# Patient Record
Sex: MALE | Race: WHITE | Employment: FULL TIME | URBAN - NONMETROPOLITAN AREA
[De-identification: names, ages, dates, MRNs, and addresses within clinical notes are randomized per-mention and may not be internally consistent; named-entity substitution may affect disease eponyms.]

---

## 2017-07-17 ENCOUNTER — APPOINTMENT (OUTPATIENT)
Dept: PHYSICAL THERAPY | Age: 72
End: 2017-07-17
Payer: MEDICARE

## 2017-07-17 ENCOUNTER — HOSPITAL ENCOUNTER (OUTPATIENT)
Dept: PHYSICAL THERAPY | Age: 72
Setting detail: THERAPIES SERIES
Discharge: HOME | End: 2017-07-17
Payer: MEDICARE

## 2017-07-17 PROCEDURE — 97110 THERAPEUTIC EXERCISES: CPT

## 2017-07-17 ASSESSMENT — PAIN SCALES - GENERAL: PAINLEVEL_OUTOF10: 3

## 2017-07-17 ASSESSMENT — PAIN DESCRIPTION - ORIENTATION: ORIENTATION: LEFT

## 2017-07-17 ASSESSMENT — PAIN DESCRIPTION - LOCATION: LOCATION: KNEE

## 2017-07-18 ENCOUNTER — APPOINTMENT (OUTPATIENT)
Dept: PHYSICAL THERAPY | Age: 72
End: 2017-07-18
Payer: MEDICARE

## 2017-07-19 ENCOUNTER — HOSPITAL ENCOUNTER (OUTPATIENT)
Dept: PHYSICAL THERAPY | Age: 72
Setting detail: THERAPIES SERIES
Discharge: HOME | End: 2017-07-19
Payer: MEDICARE

## 2017-07-19 PROCEDURE — 97110 THERAPEUTIC EXERCISES: CPT

## 2017-07-19 ASSESSMENT — PAIN DESCRIPTION - PAIN TYPE: TYPE: SURGICAL PAIN

## 2017-07-19 ASSESSMENT — PAIN DESCRIPTION - LOCATION: LOCATION: KNEE

## 2017-07-19 ASSESSMENT — PAIN DESCRIPTION - ORIENTATION: ORIENTATION: LEFT

## 2017-07-19 ASSESSMENT — PAIN SCALES - GENERAL: PAINLEVEL_OUTOF10: 3

## 2017-07-21 ENCOUNTER — HOSPITAL ENCOUNTER (OUTPATIENT)
Dept: PHYSICAL THERAPY | Age: 72
Setting detail: THERAPIES SERIES
Discharge: HOME | End: 2017-07-21
Payer: MEDICARE

## 2017-07-21 PROCEDURE — 97110 THERAPEUTIC EXERCISES: CPT

## 2017-07-25 ENCOUNTER — HOSPITAL ENCOUNTER (OUTPATIENT)
Dept: PHYSICAL THERAPY | Age: 72
Setting detail: THERAPIES SERIES
Discharge: HOME | End: 2017-07-25
Payer: MEDICARE

## 2017-07-25 PROCEDURE — 97110 THERAPEUTIC EXERCISES: CPT

## 2017-07-25 ASSESSMENT — PAIN DESCRIPTION - ORIENTATION: ORIENTATION: LEFT

## 2017-07-25 ASSESSMENT — PAIN DESCRIPTION - LOCATION: LOCATION: KNEE

## 2017-07-25 ASSESSMENT — PAIN SCALES - GENERAL: PAINLEVEL_OUTOF10: 3

## 2017-07-25 ASSESSMENT — PAIN DESCRIPTION - PAIN TYPE: TYPE: SURGICAL PAIN

## 2017-07-26 ENCOUNTER — HOSPITAL ENCOUNTER (OUTPATIENT)
Dept: PHYSICAL THERAPY | Age: 72
Setting detail: THERAPIES SERIES
Discharge: HOME | End: 2017-07-26
Payer: MEDICARE

## 2017-07-26 PROCEDURE — 97110 THERAPEUTIC EXERCISES: CPT

## 2017-07-26 ASSESSMENT — PAIN DESCRIPTION - ORIENTATION: ORIENTATION: LEFT

## 2017-07-26 ASSESSMENT — PAIN DESCRIPTION - LOCATION: LOCATION: KNEE

## 2017-07-26 ASSESSMENT — PAIN DESCRIPTION - PAIN TYPE: TYPE: SURGICAL PAIN

## 2017-07-26 ASSESSMENT — PAIN SCALES - GENERAL: PAINLEVEL_OUTOF10: 3

## 2017-08-01 ENCOUNTER — HOSPITAL ENCOUNTER (OUTPATIENT)
Dept: PHYSICAL THERAPY | Age: 72
Setting detail: THERAPIES SERIES
Discharge: HOME | End: 2017-08-01
Payer: MEDICARE

## 2017-08-01 PROCEDURE — 97110 THERAPEUTIC EXERCISES: CPT

## 2017-08-01 PROCEDURE — G8979 MOBILITY GOAL STATUS: HCPCS

## 2017-08-01 PROCEDURE — G8978 MOBILITY CURRENT STATUS: HCPCS

## 2017-08-03 ENCOUNTER — HOSPITAL ENCOUNTER (OUTPATIENT)
Dept: PHYSICAL THERAPY | Age: 72
Setting detail: THERAPIES SERIES
Discharge: HOME | End: 2017-08-03
Payer: MEDICARE

## 2017-08-03 PROCEDURE — 97110 THERAPEUTIC EXERCISES: CPT

## 2017-08-03 ASSESSMENT — PAIN SCALES - GENERAL: PAINLEVEL_OUTOF10: 3

## 2017-08-04 ENCOUNTER — HOSPITAL ENCOUNTER (OUTPATIENT)
Dept: PHYSICAL THERAPY | Age: 72
Setting detail: THERAPIES SERIES
Discharge: HOME | End: 2017-08-04
Payer: MEDICARE

## 2017-08-04 PROCEDURE — 97110 THERAPEUTIC EXERCISES: CPT

## 2017-08-07 ENCOUNTER — HOSPITAL ENCOUNTER (OUTPATIENT)
Dept: PHYSICAL THERAPY | Age: 72
Setting detail: THERAPIES SERIES
Discharge: HOME | End: 2017-08-07
Payer: MEDICARE

## 2017-08-07 PROCEDURE — 97110 THERAPEUTIC EXERCISES: CPT

## 2017-08-09 ENCOUNTER — HOSPITAL ENCOUNTER (OUTPATIENT)
Dept: PHYSICAL THERAPY | Age: 72
Setting detail: THERAPIES SERIES
Discharge: HOME | End: 2017-08-09
Payer: MEDICARE

## 2017-08-09 PROCEDURE — 97110 THERAPEUTIC EXERCISES: CPT

## 2017-08-14 ENCOUNTER — HOSPITAL ENCOUNTER (OUTPATIENT)
Dept: PHYSICAL THERAPY | Age: 72
Setting detail: THERAPIES SERIES
Discharge: HOME | End: 2017-08-14
Payer: MEDICARE

## 2017-08-14 PROCEDURE — 97110 THERAPEUTIC EXERCISES: CPT

## 2017-08-16 ENCOUNTER — HOSPITAL ENCOUNTER (OUTPATIENT)
Dept: PHYSICAL THERAPY | Age: 72
Setting detail: THERAPIES SERIES
Discharge: HOME | End: 2017-08-16
Payer: MEDICARE

## 2017-08-16 PROCEDURE — 97110 THERAPEUTIC EXERCISES: CPT

## 2017-08-18 ENCOUNTER — HOSPITAL ENCOUNTER (OUTPATIENT)
Dept: PHYSICAL THERAPY | Age: 72
Setting detail: THERAPIES SERIES
Discharge: HOME | End: 2017-08-18
Payer: MEDICARE

## 2017-08-18 PROCEDURE — 97110 THERAPEUTIC EXERCISES: CPT

## 2017-08-18 ASSESSMENT — PAIN DESCRIPTION - LOCATION: LOCATION: KNEE

## 2017-08-18 ASSESSMENT — PAIN DESCRIPTION - ORIENTATION: ORIENTATION: LEFT

## 2017-08-18 ASSESSMENT — PAIN SCALES - GENERAL: PAINLEVEL_OUTOF10: 2

## 2017-08-21 ENCOUNTER — HOSPITAL ENCOUNTER (OUTPATIENT)
Dept: PHYSICAL THERAPY | Age: 72
Setting detail: THERAPIES SERIES
Discharge: HOME | End: 2017-08-21
Payer: MEDICARE

## 2017-08-21 PROCEDURE — 97110 THERAPEUTIC EXERCISES: CPT

## 2017-08-21 ASSESSMENT — PAIN SCALES - GENERAL: PAINLEVEL_OUTOF10: 2

## 2017-08-23 ENCOUNTER — HOSPITAL ENCOUNTER (OUTPATIENT)
Dept: PHYSICAL THERAPY | Age: 72
Setting detail: THERAPIES SERIES
Discharge: HOME | End: 2017-08-23
Payer: MEDICARE

## 2017-08-23 PROCEDURE — 97110 THERAPEUTIC EXERCISES: CPT

## 2017-08-23 ASSESSMENT — PAIN SCALES - GENERAL: PAINLEVEL_OUTOF10: 1

## 2017-08-23 ASSESSMENT — PAIN DESCRIPTION - PAIN TYPE: TYPE: SURGICAL PAIN

## 2017-08-23 ASSESSMENT — PAIN DESCRIPTION - LOCATION: LOCATION: KNEE

## 2017-08-23 ASSESSMENT — PAIN DESCRIPTION - ORIENTATION: ORIENTATION: LEFT

## 2017-08-25 ENCOUNTER — HOSPITAL ENCOUNTER (OUTPATIENT)
Dept: PHYSICAL THERAPY | Age: 72
Setting detail: THERAPIES SERIES
Discharge: HOME | End: 2017-08-25
Payer: MEDICARE

## 2017-08-25 PROCEDURE — 97110 THERAPEUTIC EXERCISES: CPT

## 2017-08-25 PROCEDURE — G8979 MOBILITY GOAL STATUS: HCPCS

## 2017-08-25 PROCEDURE — G8980 MOBILITY D/C STATUS: HCPCS

## 2022-08-02 ENCOUNTER — APPOINTMENT (OUTPATIENT)
Dept: GENERAL RADIOLOGY | Age: 77
DRG: 287 | End: 2022-08-02
Payer: MEDICARE

## 2022-08-02 ENCOUNTER — HOSPITAL ENCOUNTER (INPATIENT)
Age: 77
LOS: 1 days | Discharge: HOME | DRG: 287 | End: 2022-08-05
Attending: HOSPITALIST | Admitting: STUDENT IN AN ORGANIZED HEALTH CARE EDUCATION/TRAINING PROGRAM
Payer: MEDICARE

## 2022-08-02 DIAGNOSIS — R07.9 CHEST PAIN, UNSPECIFIED TYPE: Primary | ICD-10-CM

## 2022-08-02 LAB
ALBUMIN SERPL-MCNC: 4.2 G/DL (ref 3.5–5.1)
ALP BLD-CCNC: 72 U/L (ref 38–126)
ALT SERPL-CCNC: 13 U/L (ref 11–66)
ANION GAP SERPL CALCULATED.3IONS-SCNC: 9 MEQ/L (ref 8–16)
AST SERPL-CCNC: 19 U/L (ref 5–40)
BASOPHILS # BLD: 1 %
BASOPHILS ABSOLUTE: 0.1 THOU/MM3 (ref 0–0.1)
BILIRUB SERPL-MCNC: 0.4 MG/DL (ref 0.3–1.2)
BILIRUBIN DIRECT: < 0.2 MG/DL (ref 0–0.3)
BUN BLDV-MCNC: 20 MG/DL (ref 7–22)
CALCIUM SERPL-MCNC: 9.7 MG/DL (ref 8.5–10.5)
CHLORIDE BLD-SCNC: 106 MEQ/L (ref 98–111)
CHOLESTEROL, TOTAL: 136 MG/DL (ref 100–199)
CO2: 28 MEQ/L (ref 23–33)
CREAT SERPL-MCNC: 1.2 MG/DL (ref 0.4–1.2)
EKG ATRIAL RATE: 58 BPM
EKG P AXIS: 14 DEGREES
EKG P-R INTERVAL: 220 MS
EKG Q-T INTERVAL: 444 MS
EKG QRS DURATION: 164 MS
EKG QTC CALCULATION (BAZETT): 435 MS
EKG R AXIS: -13 DEGREES
EKG T AXIS: 34 DEGREES
EKG VENTRICULAR RATE: 58 BPM
EOSINOPHIL # BLD: 4.9 %
EOSINOPHILS ABSOLUTE: 0.4 THOU/MM3 (ref 0–0.4)
ERYTHROCYTE [DISTWIDTH] IN BLOOD BY AUTOMATED COUNT: 12.9 % (ref 11.5–14.5)
ERYTHROCYTE [DISTWIDTH] IN BLOOD BY AUTOMATED COUNT: 46.5 FL (ref 35–45)
GFR SERPL CREATININE-BSD FRML MDRD: 59 ML/MIN/1.73M2
GLUCOSE BLD-MCNC: 98 MG/DL (ref 70–108)
HCT VFR BLD CALC: 44.5 % (ref 42–52)
HDLC SERPL-MCNC: 58 MG/DL
HEMOGLOBIN: 14.4 GM/DL (ref 14–18)
IMMATURE GRANS (ABS): 0.01 THOU/MM3 (ref 0–0.07)
IMMATURE GRANULOCYTES: 0.1 %
LDL CHOLESTEROL CALCULATED: 65 MG/DL
LIPASE: 29.2 U/L (ref 5.6–51.3)
LV EF: 53 %
LVEF MODALITY: NORMAL
LYMPHOCYTES # BLD: 28.9 %
LYMPHOCYTES ABSOLUTE: 2.3 THOU/MM3 (ref 1–4.8)
MCH RBC QN AUTO: 31.5 PG (ref 26–33)
MCHC RBC AUTO-ENTMCNC: 32.4 GM/DL (ref 32.2–35.5)
MCV RBC AUTO: 97.4 FL (ref 80–94)
MONOCYTES # BLD: 8.4 %
MONOCYTES ABSOLUTE: 0.7 THOU/MM3 (ref 0.4–1.3)
NUCLEATED RED BLOOD CELLS: 0 /100 WBC
OSMOLALITY CALCULATION: 287.6 MOSMOL/KG (ref 275–300)
PLATELET # BLD: 217 THOU/MM3 (ref 130–400)
PMV BLD AUTO: 10.3 FL (ref 9.4–12.4)
POTASSIUM REFLEX MAGNESIUM: 4.4 MEQ/L (ref 3.5–5.2)
PRO-BNP: 406.7 PG/ML (ref 0–1800)
RBC # BLD: 4.57 MILL/MM3 (ref 4.7–6.1)
SEG NEUTROPHILS: 56.7 %
SEGMENTED NEUTROPHILS ABSOLUTE COUNT: 4.5 THOU/MM3 (ref 1.8–7.7)
SODIUM BLD-SCNC: 143 MEQ/L (ref 135–145)
TOTAL PROTEIN: 6.4 G/DL (ref 6.1–8)
TRIGL SERPL-MCNC: 67 MG/DL (ref 0–199)
TROPONIN T: 0.01 NG/ML
TROPONIN T: 0.02 NG/ML
TROPONIN T: < 0.01 NG/ML
TROPONIN T: < 0.01 NG/ML
TSH SERPL DL<=0.05 MIU/L-ACNC: 1.1 UIU/ML (ref 0.4–4.2)
WBC # BLD: 8 THOU/MM3 (ref 4.8–10.8)

## 2022-08-02 PROCEDURE — 71045 X-RAY EXAM CHEST 1 VIEW: CPT

## 2022-08-02 PROCEDURE — 80061 LIPID PANEL: CPT

## 2022-08-02 PROCEDURE — 6370000000 HC RX 637 (ALT 250 FOR IP): Performed by: STUDENT IN AN ORGANIZED HEALTH CARE EDUCATION/TRAINING PROGRAM

## 2022-08-02 PROCEDURE — 6360000002 HC RX W HCPCS: Performed by: STUDENT IN AN ORGANIZED HEALTH CARE EDUCATION/TRAINING PROGRAM

## 2022-08-02 PROCEDURE — 83690 ASSAY OF LIPASE: CPT

## 2022-08-02 PROCEDURE — 99285 EMERGENCY DEPT VISIT HI MDM: CPT

## 2022-08-02 PROCEDURE — 96372 THER/PROPH/DIAG INJ SC/IM: CPT

## 2022-08-02 PROCEDURE — 85025 COMPLETE CBC W/AUTO DIFF WBC: CPT

## 2022-08-02 PROCEDURE — G0378 HOSPITAL OBSERVATION PER HR: HCPCS

## 2022-08-02 PROCEDURE — 84443 ASSAY THYROID STIM HORMONE: CPT

## 2022-08-02 PROCEDURE — 2580000003 HC RX 258: Performed by: INTERNAL MEDICINE

## 2022-08-02 PROCEDURE — 93306 TTE W/DOPPLER COMPLETE: CPT

## 2022-08-02 PROCEDURE — 36415 COLL VENOUS BLD VENIPUNCTURE: CPT

## 2022-08-02 PROCEDURE — 6370000000 HC RX 637 (ALT 250 FOR IP): Performed by: INTERNAL MEDICINE

## 2022-08-02 PROCEDURE — 2580000003 HC RX 258: Performed by: STUDENT IN AN ORGANIZED HEALTH CARE EDUCATION/TRAINING PROGRAM

## 2022-08-02 PROCEDURE — 6370000000 HC RX 637 (ALT 250 FOR IP): Performed by: NURSE PRACTITIONER

## 2022-08-02 PROCEDURE — 83880 ASSAY OF NATRIURETIC PEPTIDE: CPT

## 2022-08-02 PROCEDURE — 93010 ELECTROCARDIOGRAM REPORT: CPT | Performed by: INTERNAL MEDICINE

## 2022-08-02 PROCEDURE — 80076 HEPATIC FUNCTION PANEL: CPT

## 2022-08-02 PROCEDURE — 93005 ELECTROCARDIOGRAM TRACING: CPT | Performed by: INTERNAL MEDICINE

## 2022-08-02 PROCEDURE — 93005 ELECTROCARDIOGRAM TRACING: CPT | Performed by: HOSPITALIST

## 2022-08-02 PROCEDURE — 80048 BASIC METABOLIC PNL TOTAL CA: CPT

## 2022-08-02 PROCEDURE — 84484 ASSAY OF TROPONIN QUANT: CPT

## 2022-08-02 RX ORDER — LAMOTRIGINE 200 MG/1
200 TABLET ORAL DAILY
COMMUNITY
Start: 2022-07-24

## 2022-08-02 RX ORDER — SODIUM CHLORIDE 9 MG/ML
INJECTION, SOLUTION INTRAVENOUS PRN
Status: DISCONTINUED | OUTPATIENT
Start: 2022-08-02 | End: 2022-08-05 | Stop reason: HOSPADM

## 2022-08-02 RX ORDER — ONDANSETRON 2 MG/ML
4 INJECTION INTRAMUSCULAR; INTRAVENOUS EVERY 6 HOURS PRN
Status: DISCONTINUED | OUTPATIENT
Start: 2022-08-02 | End: 2022-08-05 | Stop reason: HOSPADM

## 2022-08-02 RX ORDER — ONDANSETRON 4 MG/1
4 TABLET, ORALLY DISINTEGRATING ORAL EVERY 8 HOURS PRN
Status: DISCONTINUED | OUTPATIENT
Start: 2022-08-02 | End: 2022-08-05 | Stop reason: HOSPADM

## 2022-08-02 RX ORDER — POLYETHYLENE GLYCOL 3350 17 G/17G
17 POWDER, FOR SOLUTION ORAL DAILY PRN
Status: DISCONTINUED | OUTPATIENT
Start: 2022-08-02 | End: 2022-08-05 | Stop reason: HOSPADM

## 2022-08-02 RX ORDER — LITHIUM CARBONATE 300 MG/1
300 CAPSULE ORAL DAILY
COMMUNITY
Start: 2022-07-24

## 2022-08-02 RX ORDER — SIMVASTATIN 40 MG
40 TABLET ORAL DAILY
COMMUNITY
Start: 2022-07-24

## 2022-08-02 RX ORDER — LAMOTRIGINE 100 MG/1
200 TABLET ORAL 2 TIMES DAILY
Status: DISCONTINUED | OUTPATIENT
Start: 2022-08-02 | End: 2022-08-05 | Stop reason: HOSPADM

## 2022-08-02 RX ORDER — ATORVASTATIN CALCIUM 40 MG/1
40 TABLET, FILM COATED ORAL NIGHTLY
Status: DISCONTINUED | OUTPATIENT
Start: 2022-08-02 | End: 2022-08-05 | Stop reason: HOSPADM

## 2022-08-02 RX ORDER — LITHIUM CARBONATE 300 MG/1
300 CAPSULE ORAL DAILY
Status: DISCONTINUED | OUTPATIENT
Start: 2022-08-02 | End: 2022-08-05 | Stop reason: HOSPADM

## 2022-08-02 RX ORDER — ENOXAPARIN SODIUM 100 MG/ML
30 INJECTION SUBCUTANEOUS 2 TIMES DAILY
Status: DISCONTINUED | OUTPATIENT
Start: 2022-08-02 | End: 2022-08-05 | Stop reason: HOSPADM

## 2022-08-02 RX ORDER — ACETAMINOPHEN 650 MG/1
650 SUPPOSITORY RECTAL EVERY 6 HOURS PRN
Status: DISCONTINUED | OUTPATIENT
Start: 2022-08-02 | End: 2022-08-05 | Stop reason: HOSPADM

## 2022-08-02 RX ORDER — SODIUM CHLORIDE 0.9 % (FLUSH) 0.9 %
5-40 SYRINGE (ML) INJECTION PRN
Status: DISCONTINUED | OUTPATIENT
Start: 2022-08-02 | End: 2022-08-05

## 2022-08-02 RX ORDER — ASPIRIN 81 MG/1
81 TABLET, CHEWABLE ORAL DAILY
Status: DISCONTINUED | OUTPATIENT
Start: 2022-08-03 | End: 2022-08-05 | Stop reason: HOSPADM

## 2022-08-02 RX ORDER — LEVOTHYROXINE SODIUM 175 UG/1
175 TABLET ORAL DAILY
COMMUNITY
Start: 2022-07-24

## 2022-08-02 RX ORDER — SODIUM CHLORIDE 0.9 % (FLUSH) 0.9 %
5-40 SYRINGE (ML) INJECTION EVERY 12 HOURS SCHEDULED
Status: DISCONTINUED | OUTPATIENT
Start: 2022-08-02 | End: 2022-08-05

## 2022-08-02 RX ORDER — SODIUM CHLORIDE 9 MG/ML
INJECTION, SOLUTION INTRAVENOUS CONTINUOUS
Status: DISCONTINUED | OUTPATIENT
Start: 2022-08-02 | End: 2022-08-02

## 2022-08-02 RX ORDER — ASPIRIN 81 MG/1
324 TABLET, CHEWABLE ORAL ONCE
Status: COMPLETED | OUTPATIENT
Start: 2022-08-02 | End: 2022-08-02

## 2022-08-02 RX ORDER — ACETAMINOPHEN 325 MG/1
650 TABLET ORAL EVERY 6 HOURS PRN
Status: DISCONTINUED | OUTPATIENT
Start: 2022-08-02 | End: 2022-08-05 | Stop reason: HOSPADM

## 2022-08-02 RX ADMIN — SODIUM CHLORIDE, PRESERVATIVE FREE 10 ML: 5 INJECTION INTRAVENOUS at 20:21

## 2022-08-02 RX ADMIN — LAMOTRIGINE 200 MG: 100 TABLET ORAL at 20:23

## 2022-08-02 RX ADMIN — SODIUM CHLORIDE, PRESERVATIVE FREE 10 ML: 5 INJECTION INTRAVENOUS at 10:43

## 2022-08-02 RX ADMIN — ENOXAPARIN SODIUM 30 MG: 100 INJECTION SUBCUTANEOUS at 10:41

## 2022-08-02 RX ADMIN — ATORVASTATIN CALCIUM 40 MG: 40 TABLET, FILM COATED ORAL at 20:22

## 2022-08-02 RX ADMIN — ASPIRIN 324 MG: 81 TABLET, CHEWABLE ORAL at 04:26

## 2022-08-02 RX ADMIN — SODIUM CHLORIDE: 9 INJECTION, SOLUTION INTRAVENOUS at 10:47

## 2022-08-02 ASSESSMENT — ENCOUNTER SYMPTOMS
EYE REDNESS: 0
COUGH: 0
CHEST TIGHTNESS: 0
VOMITING: 0
RHINORRHEA: 0
ABDOMINAL PAIN: 0
NAUSEA: 0
BACK PAIN: 0

## 2022-08-02 ASSESSMENT — PAIN DESCRIPTION - ORIENTATION
ORIENTATION: LEFT
ORIENTATION: LEFT

## 2022-08-02 ASSESSMENT — LIFESTYLE VARIABLES
HOW MANY STANDARD DRINKS CONTAINING ALCOHOL DO YOU HAVE ON A TYPICAL DAY: PATIENT DOES NOT DRINK
HOW OFTEN DO YOU HAVE A DRINK CONTAINING ALCOHOL: NEVER

## 2022-08-02 ASSESSMENT — PAIN DESCRIPTION - LOCATION
LOCATION: CHEST
LOCATION: CHEST

## 2022-08-02 ASSESSMENT — PAIN - FUNCTIONAL ASSESSMENT: PAIN_FUNCTIONAL_ASSESSMENT: 0-10

## 2022-08-02 ASSESSMENT — PAIN DESCRIPTION - PAIN TYPE
TYPE: ACUTE PAIN
TYPE: ACUTE PAIN

## 2022-08-02 ASSESSMENT — PAIN SCALES - GENERAL
PAINLEVEL_OUTOF10: 2
PAINLEVEL_OUTOF10: 2

## 2022-08-02 NOTE — H&P
History & Physical       Patient: David Alonzo  YOB: 1945    MRN: [id number]     Acct: [de-identified]    PCP: Irina Joyce MD    Date of Admission: 8/2/2022    Date of Service: Patient seen / examined on 08/02/22 and admitted to Observation with expected LOS less than two midnights due to medical therapy. ASSESSMENT / PLAN:    Atypical chest pain: Patient denies any history of cardiac disease. States was working out and developed shortness of breath along with chest discomfort. Denies any radiation of chest discomfort. Denies any diaphoresis. Was relieved with rest.  EKG shows sinus bradycardia with first-degree AV block with no prior to compare. Initial troponin slightly elevated 0.016. Will repeat troponin along with EKG this AM.  Obtain echocardiogram.  Unlikely ACS related. Low pretest probability for CAD. Maintain telemetry. Chief Complaint: Chest discomfort    History of Present Illness:   David Alonzo helen 78 y.o. male who presents to the ED for evaluation of chest pain. The patient was on his peloton doing his work out and noted chest pain, back pain and left arm pain. Patient states he couldn't finish the work out. This is unusual for him as he normally does this work out three times a week. He states when he rested after the work out the pain improved but he just feels off. No history of cardiac dysfunction. Has never had a cardiac work up. HPI was provided by the patient    Past Medical History:    History reviewed. No pertinent past medical history. Past Surgical History:    Past Surgical History:   Procedure Laterality Date    JOINT REPLACEMENT        Medications Prior to Admission:   No current facility-administered medications on file prior to encounter. No current outpatient medications on file prior to encounter. Allergies:   Patient has no known allergies.     Social History:   Social History     Socioeconomic History    Marital status:      Spouse name: Not on file    Number of children: Not on file    Years of education: Not on file    Highest education level: Not on file   Occupational History    Not on file   Tobacco Use    Smoking status: Every Day     Types: Cigars    Smokeless tobacco: Never   Substance and Sexual Activity    Alcohol use: Yes     Comment: occasionaly    Drug use: Never    Sexual activity: Not on file   Other Topics Concern    Not on file   Social History Narrative    Not on file     Social Determinants of Health     Financial Resource Strain: Not on file   Food Insecurity: Not on file   Transportation Needs: Not on file   Physical Activity: Not on file   Stress: Not on file   Social Connections: Not on file   Intimate Partner Violence: Not on file   Housing Stability: Not on file     Family History:    History reviewed. No pertinent family history. REVIEW OF SYSTEMS:  A 14-point ROS was obtained and negative, with the exception of pertinent positives as listed below:    PHYSICAL EXAM:  Vitals:    08/02/22 0316 08/02/22 0414 08/02/22 0515 08/02/22 0520   BP: (!) 180/82 134/77 (!) 176/74    Pulse: 57 52 51 52   Resp: 18 17     Temp: 98.4 °F (36.9 °C)  97.6 °F (36.4 °C)    TempSrc: Oral  Oral    SpO2: 97% 95% 98%    Weight: 225 lb (102.1 kg)  224 lb 15.7 oz (102 kg)    Height: 6' 3\" (1.905 m)  6' 3\" (1.905 m)      General appearance: Alert / well-appearing. Cooperative. NAD. HEENT:  Normocephalic / atraumatic. PERRL. EOM intact. Conjunctivae appear normal.  Neck: Supple. No JVD. Respiratory: Normal respiratory effort on RA. CTAB. No wheezes / rales / rhonchi. Cardiovascular: RRR. Normal S1/S2. No murmurs / rubs / gallops. Abdomen: Soft / non-tender / non-distended. BS present. Musculoskeletal: No cyanosis or edema. Skin: Warm / dry. Normal turgor. Neurologic: A/O x 3. Speech normal. Answers questions appropriately. CN intact. No obvious focal neurologic deficits.   Psychiatric: Thought content / judgment / Estimated   Result Value Ref Range    Est, Glom Filt Rate 59 (A) ml/min/1.73m2   Osmolality   Result Value Ref Range    Osmolality Calc 287.6 275.0 - 300.0 mOsmol/kg   EKG 12 Lead   Result Value Ref Range    Ventricular Rate 58 BPM    Atrial Rate 58 BPM    P-R Interval 220 ms    QRS Duration 164 ms    Q-T Interval 444 ms    QTc Calculation (Bazett) 435 ms    P Axis 14 degrees    R Axis -13 degrees    T Axis 34 degrees       EKG / Radiology:     EKG:  Reviewed by me --    CXR:   Reviewed by me --    XR CHEST PORTABLE    Result Date: 8/2/2022  1 view chest x-ray Comparison: DX - ABDOMEN ACUTE 3 VIEW - 04/13/2010 02:35 PM EDT Findings: Linear focus at the base of the left lung. No consolidation or pleural effusion. Heart size is normal. No acute fracture. Minimal linear atelectasis or scarring at the base of the left lung. This document has been electronically signed by: Tulio Hubbard MD on 08/02/2022 04:25 AM      CODE STATUS:  Full    Thank you Charlaine Claude, MD for the opportunity to be involved in this patient's care.     Electronically signed by Teodora Proctor DO on 8/2/2022 at 6:27 AM

## 2022-08-02 NOTE — ED PROVIDER NOTES
[facility] Emergency Department    CHIEF COMPLAINT       Chief Complaint   Patient presents with    Chest Pain       Nurses Notes reviewed and I agree except as noted in the HPI. HISTORY OF PRESENT ILLNESS    Nieves Sandhu helen 78 y.o. male who presents to the ED for evaluation of chest pain. The patient was on his peloton doing his work out and noted chest pain, back pain and left arm pain. Patient states he couldn't finish the work out. This is unusual for him as he normally does this work out three times a week. He states when he rested after the work out the pain improved but he just feels off. No history of cardiac dysfunction. Has never had a cardiac work up. HPI was provided by the patient    REVIEW OF SYSTEMS     Review of Systems   Constitutional:  Positive for fatigue. Negative for chills and fever. HENT:  Negative for congestion, ear discharge, ear pain, postnasal drip and rhinorrhea. Eyes:  Negative for redness. Respiratory:  Negative for cough and chest tightness. Cardiovascular:  Positive for chest pain. Negative for leg swelling. Gastrointestinal:  Negative for abdominal pain, nausea and vomiting. Genitourinary:  Negative for difficulty urinating, dysuria, enuresis, flank pain and hematuria. Musculoskeletal:  Positive for arthralgias and myalgias. Negative for back pain and joint swelling. Skin:  Negative for rash. Neurological:  Negative for dizziness, light-headedness, numbness and headaches. Psychiatric/Behavioral:  Negative for agitation, behavioral problems and confusion. All other systems negative except as noted. PAST MEDICAL HISTORY   History reviewed. No pertinent past medical history. SURGICALHISTORY      has a past surgical history that includes joint replacement. CURRENT MEDICATIONS       Previous Medications    No medications on file       ALLERGIES     has No Known Allergies.     FAMILY HISTORY     has no family status information on file. family history is not on file. SOCIAL HISTORY       Social History     Socioeconomic History    Marital status:      Spouse name: Not on file    Number of children: Not on file    Years of education: Not on file    Highest education level: Not on file   Occupational History    Not on file   Tobacco Use    Smoking status: Every Day     Types: Cigars    Smokeless tobacco: Never   Substance and Sexual Activity    Alcohol use: Yes     Comment: occasionaly    Drug use: Never    Sexual activity: Not on file   Other Topics Concern    Not on file   Social History Narrative    Not on file     Social Determinants of Health     Financial Resource Strain: Not on file   Food Insecurity: Not on file   Transportation Needs: Not on file   Physical Activity: Not on file   Stress: Not on file   Social Connections: Not on file   Intimate Partner Violence: Not on file   Housing Stability: Not on file       PHYSICAL EXAM     INITIAL VITALS:  height is 6' 3\" (1.905 m) and weight is 225 lb (102.1 kg). His oral temperature is 98.4 °F (36.9 °C). His blood pressure is 134/77 and his pulse is 52. His respiration is 17 and oxygen saturation is 95%. Physical Exam  Vitals and nursing note reviewed. Constitutional:       General: He is not in acute distress. Appearance: Normal appearance. He is well-developed. He is not ill-appearing or diaphoretic. HENT:      Head: Normocephalic and atraumatic. Nose: Nose normal.      Mouth/Throat:      Mouth: Mucous membranes are moist.      Pharynx: Oropharynx is clear. Eyes:      General:         Right eye: No discharge. Left eye: No discharge. Conjunctiva/sclera: Conjunctivae normal.   Neck:      Trachea: No tracheal deviation. Cardiovascular:      Rate and Rhythm: Normal rate and regular rhythm. Pulses: Normal pulses. Heart sounds: Normal heart sounds. No murmur heard. No gallop.       Comments: Normal capillary refill  Pulmonary: Effort: Pulmonary effort is normal. No respiratory distress. Breath sounds: Normal breath sounds. No stridor. Abdominal:      General: Bowel sounds are normal.      Palpations: Abdomen is soft. Musculoskeletal:         General: No tenderness or deformity. Normal range of motion. Cervical back: Normal range of motion. Skin:     General: Skin is warm and dry. Capillary Refill: Capillary refill takes less than 2 seconds. Coloration: Skin is not pale. Findings: No erythema or rash. Neurological:      General: No focal deficit present. Mental Status: He is alert and oriented to person, place, and time. Cranial Nerves: No cranial nerve deficit. Psychiatric:         Mood and Affect: Mood normal.         Behavior: Behavior normal.       DIFFERENTIAL DIAGNOSIS:   Acs, cad, chest wall pain    DIAGNOSTIC RESULTS     EKG: All EKG's are interpreted by the Emergency Department Physician who eithersigns or Co-signs this chart in the absence of a cardiologist.        RADIOLOGY: non-plainfilm images(s) such as CT, Ultrasound and MRI are read by the radiologist.  Plain radiographic images are visualized and preliminarily interpreted by the emergency physician unless otherwise stated below. XR CHEST PORTABLE   Final Result   Minimal linear atelectasis or scarring at the base of the left lung.       This document has been electronically signed by: Wolf Brennan MD on    08/02/2022 04:25 AM            LABS:   Labs Reviewed   CBC WITH AUTO DIFFERENTIAL - Abnormal; Notable for the following components:       Result Value    RBC 4.57 (*)     MCV 97.4 (*)     RDW-SD 46.5 (*)     All other components within normal limits   TROPONIN - Abnormal; Notable for the following components:    Troponin T 0.019 (*)     All other components within normal limits   GLOMERULAR FILTRATION RATE, ESTIMATED - Abnormal; Notable for the following components:    Est, Glom Filt Rate 59 (*)     All other components within normal limits   BASIC METABOLIC PANEL W/ REFLEX TO MG FOR LOW K   BRAIN NATRIURETIC PEPTIDE   LIPASE   HEPATIC FUNCTION PANEL   ANION GAP   OSMOLALITY   TROPONIN   TROPONIN   TROPONIN       EMERGENCY DEPARTMENT COURSE:   Vitals:    Vitals:    08/02/22 0316 08/02/22 0414   BP: (!) 180/82 134/77   Pulse: 57 52   Resp: 18 17   Temp: 98.4 °F (36.9 °C)    TempSrc: Oral    SpO2: 97% 95%   Weight: 225 lb (102.1 kg)    Height: 6' 3\" (1.905 m)                                  Internal Administration   First Dose      Second Dose           Last COVID Lab No results found for: SARS-COV-2, SARS-COV-2 RNA, SARS-COV-2, SARS-COV-2, SARS-COV-2 BY PCR, SARS-COV-2, SARS-COV-2, SARS-COV-2         MDM      Patient was seen and evaluated in the emergency department, patient appeared to be in stable condition. Vital signs assessed, no abnormality noted. Physical exam completed. Generally benign exam noted. Appropriate labs and imaging Ordered. Based on my physical exam and work up I believe the patient has risk of cardiac dysfunction and should be admitted. I discussed my findings and plan of care with patient. They are amenable with poc to admit for further work up. While here in the emergency department they maintained a stable course and were appropriate for admission. Accepted by Dr. Antoinette Hernandez. .    No notes of  Admission Criteria type on file.       Medications   sodium chloride flush 0.9 % injection 5-40 mL (has no administration in time range)   sodium chloride flush 0.9 % injection 5-40 mL (has no administration in time range)   0.9 % sodium chloride infusion (has no administration in time range)   ondansetron (ZOFRAN-ODT) disintegrating tablet 4 mg (has no administration in time range)     Or   ondansetron (ZOFRAN) injection 4 mg (has no administration in time range)   acetaminophen (TYLENOL) tablet 650 mg (has no administration in time range)     Or   acetaminophen (TYLENOL) suppository 650 mg (has no administration in time range)   polyethylene glycol (GLYCOLAX) packet 17 g (has no administration in time range)   aspirin chewable tablet 81 mg (has no administration in time range)   atorvastatin (LIPITOR) tablet 40 mg (has no administration in time range)   enoxaparin (LOVENOX) injection 40 mg (has no administration in time range)   aspirin chewable tablet 324 mg (324 mg Oral Given 8/2/22 8475)       Please note that the patient was evaluated during a pandemic. All efforts were made for HIPPA compliance as well as provision of appropriate care. Patient was seen independently by myself. The patient's final impression and disposition and plan was determined by myself. Strict return precautions and follow up instructions were discussed with the patient prior to discharge, with which the patient agrees. Physical assessment findings, diagnostic testing(s) if applicable, and vital signs reviewed with patient/patient representative. Questions answered. Medications asdirected, including OTC medications for supportive care. Education provided on medications. Differential diagnosis(s) discussed with patient/patient representative. Home care/self care instructions reviewed withpatient/patient representative. Patient is to follow-up with family care provider in 2-3 days if no improvement. Patient is to go to the emergency department if symptoms worsen. Patient/patient representative isaware of care plan, questions answered, verbalizes understanding and is in agreement. CRITICAL CARE:   None    CONSULTS:  Hospitalist    PROCEDURES:  None    FINAL IMPRESSION     1. Chest pain, unspecified type          DISPOSITION/PLAN   DISPOSITION Admitted 08/02/2022 04:43:12 AM      PATIENT REFERREDTO:  No follow-up provider specified.     DISCHARGE MEDICATIONS:  New Prescriptions    No medications on file       (Please note that portions of this note were completed with a voice recognition program.  Efforts were made to edit the dictations but occasionally words are mis-transcribed.)         MARGARITA Huizar CNP, APRN - CNP  08/02/22 9737

## 2022-08-02 NOTE — ED NOTES
Pt transferred to [facility] room 012 nurse informed.       Odalys Porras  08/02/22 4537

## 2022-08-02 NOTE — PLAN OF CARE
Problem: Discharge Planning  Goal: Discharge to home or other facility with appropriate resources  Outcome: Progressing     Problem: Pain  Goal: Verbalizes/displays adequate comfort level or baseline comfort level  Outcome: Progressing     Problem: Safety - Adult  Goal: Free from fall injury  Outcome: Progressing     Problem: Skin/Tissue Integrity  Goal: Absence of new skin breakdown  Description: 1. Monitor for areas of redness and/or skin breakdown  2. Assess vascular access sites hourly  3. Every 4-6 hours minimum:  Change oxygen saturation probe site  4. Every 4-6 hours:  If on nasal continuous positive airway pressure, respiratory therapy assess nares and determine need for appliance change or resting period. Outcome: Progressing     Problem: Safety - Adult  Goal: Free from fall injury  Outcome: Progressing     Problem: Pain  Goal: Verbalizes/displays adequate comfort level or baseline comfort level  Outcome: Progressing     Problem: Discharge Planning  Goal: Discharge to home or other facility with appropriate resources  Outcome: Progressing     Problem: Skin/Tissue Integrity  Goal: Absence of new skin breakdown  Description: 1. Monitor for areas of redness and/or skin breakdown  2. Assess vascular access sites hourly  3. Every 4-6 hours minimum:  Change oxygen saturation probe site  4. Every 4-6 hours:  If on nasal continuous positive airway pressure, respiratory therapy assess nares and determine need for appliance change or resting period.   Outcome: Progressing

## 2022-08-02 NOTE — ED NOTES
ED to inpatient nurses report    Chief Complaint   Patient presents with    Chest Pain      Present to ED from home  LOC: alert and orientated to name, place, date  Vital signs   Vitals:    08/02/22 0316 08/02/22 0414   BP: (!) 180/82 134/77   Pulse: 57 52   Resp: 18 17   Temp: 98.4 °F (36.9 °C)    TempSrc: Oral    SpO2: 97% 95%   Weight: 225 lb (102.1 kg)    Height: 6' 3\" (1.905 m)       Oxygen Baseline room air     Current needs required room air    LDAs:   Peripheral IV 08/02/22 Right Antecubital (Active)   Site Assessment Clean, dry & intact 08/02/22 0318   Line Status Blood return noted; Flushed;Specimen collected 08/02/22 0318   Dressing Status Clean, dry & intact 08/02/22 0318     Mobility: Independent  Pending ED orders: complete  Present condition: stable    C-SSRS    Swallow Screening      Electronically signed by Mirella Shafer RN on 8/2/2022 at 4:47 AM       Mirella Shafer RN  08/02/22 [id number]

## 2022-08-02 NOTE — PROGRESS NOTES
Pharmacist Review and Automatic Dose Adjustment of Prophylactic Enoxaparin    *Review reason for admission/hospital problem list*    The reviewing pharmacist has made an adjustment to the ordered enoxaparin dose or converted to UFH per the approved [facility] protocol and table as identified below. Cierra Trujillo is a 78 y.o. male. Recent Labs     08/02/22  0325   CREATININE 1.2       Estimated Creatinine Clearance: 69 mL/min (based on SCr of 1.2 mg/dL). Recent Labs     08/02/22  0325   HGB 14.4   HCT 44.5        No results for input(s): INR in the last 72 hours. Height:   Ht Readings from Last 1 Encounters:   08/02/22 6' 3\" (1.905 m)     Weight:  Wt Readings from Last 1 Encounters:   08/02/22 225 lb (102.1 kg)               Plan: Based upon the patient's weight and renal function, the ordered enoxaparin dose of 40 mg daily has been changed/converted to 30 mg BID.       Thank you,  Constantino Beauchamp, [facility]  8/2/2022, 5:00 AM

## 2022-08-02 NOTE — ED NOTES
Pt is resting in cot with respirations even and unlabored watching TV. Pt voices no concern or need at this time. Wife is at bedside. Call light is within reach.      Himanshu Tena RN  08/02/22 0076

## 2022-08-02 NOTE — CARE COORDINATION
8/2/22, 7:29 AM EDT  DISCHARGE PLANNING EVALUATION:    [place]       Admitted: Gus Farrell day: 0   Location: 6-13/013-A Reason for admit: Chest pain [R07.9]  Chest pain, unspecified type [R07.9]   PMH:  has no past medical history on file. Procedure: none  Barriers to Discharge:  Trops 0.019, 0.012. Hypertensive, last 176/74. ASA, statin, lovenox. Telemetry, sinus gerard. Echo pending. PCP: Yousif Blanc MD   %  Patient's Healthcare Decision Maker: Legal Next of Kin    Patient Goals/Plan/Treatment Preferences: Met with Liz Juarez, he plans to return home with his wife at discharge. He is independent, denies need for [facility] or [facility]. He confirms PCP and insurance. Transportation/Food Security/Housekeeping Addressed:  No issues identified. Anticipate discharge later today. 8/2/22, 11:42 AM EDT    Patient goals/plan/ treatment preferences discussed by  and . Patient goals/plan/ treatment preferences reviewed with patient/ family. Patient/ family verbalize understanding of discharge plan and are in agreement with goal/plan/treatment preferences. Understanding was demonstrated using the teach back method. AVS provided by RN at time of discharge, which includes all necessary medical information pertaining to the patients current course of illness, treatment, post-discharge goals of care, and treatment preferences.      Services At/After Discharge: None       IMM Letter  Observation Status Letter date given[de-identified] 08/02/22  Observation Status Letter time given[de-identified] 0458  Observation Status Letter given to Patient/Family/Significant other/Guardian/POA/by[de-identified] staff

## 2022-08-02 NOTE — FLOWSHEET NOTE
08/02/22 1115   Safe Environment   Safety Measures Other (comment)  (Virtual safety check complete)   Patient lying in bed in good spirits. Wife at bedside. Explained virtual nursing to patient, completed admission profile, and went over plan of care. Patient denies further needs at this time. Call light in reach.

## 2022-08-02 NOTE — PLAN OF CARE
Patient admitted earlier this morning. He is resting in bed comfortably, no apparent distress. Remains afebrile, with hemodynamically stable vital signs. Still reporting some 2/10 left chest discomfort. Denies shortness of breath at rest, dyspnea on exertion, dizziness, palpitations, abdominal pain, nausea, vomiting, fever, chills. Repeat troponin is downtrending. Patient troponin initially elevated at 0.019. Repeat troponin downtrending to 0.012. Patient informed me that his chest pains occurred when he was exercising on his Peloton bike, that decreased and resolved at rest.  EKG reviewed showing sinus bradycardia with first-degree AV block, right bundle branch block has resolved from ECG obtained on admission. No prior ECGs to compare these to. Agree with obtaining echocardiogram, trending troponins continuous telemetry. Patient is a chronic smoker. Given nature of complaints, history of tobacco abuse, elevated troponins we will consult cardiology for further recommendations.

## 2022-08-02 NOTE — ED TRIAGE NOTES
Pt presents to the ED from home with complaints of chest pain, 2/10. Pt states he works out about 3 x a week and yesterday when he was working out he had this sudden chest pain/pressure. Pt states after his work out, he felt very weak and he could hardly make it home. Pt states it feels kind of musculoskeletal related. Pt denies feeling short of breath. Pt denies having a cardiac hx. Pt states he takes simvastatin, lamotrigine, synthroid at home. Pt voices no other concern at this time. Pt is alert and oriented x4 with respirations even and unlabored.

## 2022-08-03 PROBLEM — I20.8 STABLE ANGINA (HCC): Status: ACTIVE | Noted: 2022-08-03

## 2022-08-03 PROBLEM — F31.9 BIPOLAR 1 DISORDER (HCC): Status: ACTIVE | Noted: 2022-08-03

## 2022-08-03 PROBLEM — F17.200 SMOKING: Status: ACTIVE | Noted: 2022-08-03

## 2022-08-03 PROBLEM — E78.2 MIXED HYPERLIPIDEMIA: Status: ACTIVE | Noted: 2022-08-03

## 2022-08-03 PROBLEM — I20.89 STABLE ANGINA: Status: ACTIVE | Noted: 2022-08-03

## 2022-08-03 PROBLEM — E03.9 HYPOTHYROID: Status: ACTIVE | Noted: 2022-08-03

## 2022-08-03 LAB
ANION GAP SERPL CALCULATED.3IONS-SCNC: 8 MEQ/L (ref 8–16)
BILIRUBIN URINE: NEGATIVE
BLOOD, URINE: NEGATIVE
BUN BLDV-MCNC: 15 MG/DL (ref 7–22)
CALCIUM SERPL-MCNC: 9.6 MG/DL (ref 8.5–10.5)
CHARACTER, URINE: CLEAR
CHLORIDE BLD-SCNC: 107 MEQ/L (ref 98–111)
CO2: 26 MEQ/L (ref 23–33)
COLOR: YELLOW
CREAT SERPL-MCNC: 1.1 MG/DL (ref 0.4–1.2)
EKG ATRIAL RATE: 51 BPM
EKG P-R INTERVAL: 248 MS
EKG Q-T INTERVAL: 460 MS
EKG QRS DURATION: 164 MS
EKG QTC CALCULATION (BAZETT): 423 MS
EKG R AXIS: -19 DEGREES
EKG T AXIS: -20 DEGREES
EKG VENTRICULAR RATE: 51 BPM
ERYTHROCYTE [DISTWIDTH] IN BLOOD BY AUTOMATED COUNT: 13.1 % (ref 11.5–14.5)
ERYTHROCYTE [DISTWIDTH] IN BLOOD BY AUTOMATED COUNT: 46.5 FL (ref 35–45)
GFR SERPL CREATININE-BSD FRML MDRD: 65 ML/MIN/1.73M2
GLUCOSE BLD-MCNC: 99 MG/DL (ref 70–108)
GLUCOSE, URINE: NEGATIVE MG/DL
HCT VFR BLD CALC: 43.1 % (ref 42–52)
HEMOGLOBIN: 14 GM/DL (ref 14–18)
KETONES, URINE: NEGATIVE
LEUKOCYTE EST, POC: NEGATIVE
MCH RBC QN AUTO: 31.8 PG (ref 26–33)
MCHC RBC AUTO-ENTMCNC: 32.5 GM/DL (ref 32.2–35.5)
MCV RBC AUTO: 98 FL (ref 80–94)
NITRITE, URINE: NEGATIVE
PH UA: 7 (ref 5–9)
PLATELET # BLD: 193 THOU/MM3 (ref 130–400)
PMV BLD AUTO: 10.3 FL (ref 9.4–12.4)
POTASSIUM REFLEX MAGNESIUM: 4.5 MEQ/L (ref 3.5–5.2)
PROTEIN UA: NEGATIVE MG/DL
RBC # BLD: 4.4 MILL/MM3 (ref 4.7–6.1)
SODIUM BLD-SCNC: 141 MEQ/L (ref 135–145)
SPECIFIC GRAVITY UA: 1.01 (ref 1–1.03)
UROBILINOGEN, URINE: 0.2 EU/DL (ref 0–1)
WBC # BLD: 7.2 THOU/MM3 (ref 4.8–10.8)

## 2022-08-03 PROCEDURE — 6370000000 HC RX 637 (ALT 250 FOR IP): Performed by: STUDENT IN AN ORGANIZED HEALTH CARE EDUCATION/TRAINING PROGRAM

## 2022-08-03 PROCEDURE — 6360000002 HC RX W HCPCS: Performed by: STUDENT IN AN ORGANIZED HEALTH CARE EDUCATION/TRAINING PROGRAM

## 2022-08-03 PROCEDURE — 99223 1ST HOSP IP/OBS HIGH 75: CPT | Performed by: INTERNAL MEDICINE

## 2022-08-03 PROCEDURE — 6370000000 HC RX 637 (ALT 250 FOR IP): Performed by: INTERNAL MEDICINE

## 2022-08-03 PROCEDURE — 99219 PR INITIAL OBSERVATION CARE/DAY 50 MINUTES: CPT | Performed by: STUDENT IN AN ORGANIZED HEALTH CARE EDUCATION/TRAINING PROGRAM

## 2022-08-03 PROCEDURE — 2580000003 HC RX 258: Performed by: STUDENT IN AN ORGANIZED HEALTH CARE EDUCATION/TRAINING PROGRAM

## 2022-08-03 PROCEDURE — 93010 ELECTROCARDIOGRAM REPORT: CPT | Performed by: INTERNAL MEDICINE

## 2022-08-03 PROCEDURE — 80048 BASIC METABOLIC PNL TOTAL CA: CPT

## 2022-08-03 PROCEDURE — 81003 URINALYSIS AUTO W/O SCOPE: CPT

## 2022-08-03 PROCEDURE — 85027 COMPLETE CBC AUTOMATED: CPT

## 2022-08-03 PROCEDURE — 36415 COLL VENOUS BLD VENIPUNCTURE: CPT

## 2022-08-03 PROCEDURE — 96372 THER/PROPH/DIAG INJ SC/IM: CPT

## 2022-08-03 PROCEDURE — G0378 HOSPITAL OBSERVATION PER HR: HCPCS

## 2022-08-03 RX ADMIN — ENOXAPARIN SODIUM 30 MG: 100 INJECTION SUBCUTANEOUS at 09:57

## 2022-08-03 RX ADMIN — LAMOTRIGINE 200 MG: 100 TABLET ORAL at 21:49

## 2022-08-03 RX ADMIN — SODIUM CHLORIDE, PRESERVATIVE FREE 10 ML: 5 INJECTION INTRAVENOUS at 09:57

## 2022-08-03 RX ADMIN — LEVOTHYROXINE SODIUM 175 MCG: 0.15 TABLET ORAL at 21:49

## 2022-08-03 RX ADMIN — ASPIRIN 81 MG: 81 TABLET, CHEWABLE ORAL at 09:56

## 2022-08-03 RX ADMIN — ENOXAPARIN SODIUM 30 MG: 100 INJECTION SUBCUTANEOUS at 20:41

## 2022-08-03 RX ADMIN — ATORVASTATIN CALCIUM 40 MG: 40 TABLET, FILM COATED ORAL at 21:48

## 2022-08-03 RX ADMIN — LITHIUM CARBONATE 300 MG: 300 CAPSULE ORAL at 21:50

## 2022-08-03 RX ADMIN — SODIUM CHLORIDE, PRESERVATIVE FREE 10 ML: 5 INJECTION INTRAVENOUS at 20:41

## 2022-08-03 NOTE — FLOWSHEET NOTE
08/03/22 1810   Safe Environment   Safety Measures Other (comment)  (Virtual Safety Round Complete)     Patient lying in bed, awake, call light within reach.  No safety concerns

## 2022-08-03 NOTE — CONSULTS
[facility]    Patient's Name/Date of Birth: Akosua Bond / 1945 (89 y.o.)    Date: August 3, 2022     Referring Provider: Kim Johnson MD    CHIEF COMPLAINT: CP      HPI: This is a pleasant 78 y.o. male with a history of HLD, hyperthyroidism s/p irradiation on levothyroxine, and bipolar disorder who presents with chest pain. States chest pain began Monday evening around 6PM while he was exercising on a peloton at a gym near his home. He first noticed pain in his back, which then radiated to his left chest. He states that pain was insignificant and rated it 2/10, however he also experienced shortness of breath and diaphoresis (\"cold sweats\"). He walked home from the gym and states he \"barely made it\" due to shortness of breath. Symptoms lasted at least 30 minutes throughout the time when he stopped exercising and walked home. Pain and dyspnea resolved with rest at home, however he woke up last night feeling \"off\" and worried about another episode so he came in to the ED. Troponins were mildly elevated at presentation 0.02>0.01 and are now within reference ranges. He states that he's never experienced similar symptoms and has no known cardiac history. Reports negative cardiac family history. HEART score 5. William [place] Classification aged out, but likely >72%. Denies current headache, dizziness, diaphoresis, chest pain/tightness, back pain, upper extremity pain/numbness/tingling, dyspnea, abdominal pain, n/v, changes in bladder bowels, skin changes, or edema.      Echo: Results for orders placed during the hospital encounter of 08/02/22    ECHO Complete 2D W Doppler W Color    Narrative  Transthoracic Echocardiography Report (TTE)    Demographics    Patient Name    Mary Anne Huitron Gender                Male    MR #            [id number]     Race                      Ethnicity    Account #       [de-identified]     Room Number           1434    Accession       [id number] Date of Study         08/02/2022  Number    Date of Birth   1945    Referring Physician   Laurance Phoenix MD Cyndee Levy, DO    Age             78 year(s)    Sonographer           Kath Garcia Cera, MD  Physician    Procedure    Type of Study    TTE procedure:ECHOCARDIOGRAM COMPLETE 2D W DOPPLER W COLOR. Procedure Date  Date: 08/02/2022 Start: 10:10 AM    Study Location: Bedside  Technical Quality: Limited visualization due to lung interference . Indications:Shortness of breath and Chest pain. Additional Medical History:Smoker    Patient Status: Routine    Height: 75 inches Weight: 224 pounds BSA: 2.3 m^2 BMI: 28 kg/m^2    BP: 176/74 mmHg    Conclusions    Summary  Normal left ventricular size and systolic function. There were no regional wall motion abnormalities. Wall thickness was within normal limits. Ejection fraction was estimated at 50-55%. Doppler parameters were consistent with abnormal left ventricular  relaxation (grade 1 diastolic dysfunction). There was trace aortic regurgitation. Ascending aorta = 4.3 cm. IVC size is within normal limits with normal respiratory phasic changes. Signature    ----------------------------------------------------------------  Electronically signed by China Mercado MD (Interpreting  physician) on 08/02/2022 at 02:26 PM  ----------------------------------------------------------------    Findings    Mitral Valve  The mitral valve structure was normal with normal leaflet separation. DOPPLER: The transmitral velocity was within the normal range with no  evidence for mitral stenosis. There was no evidence of mitral  regurgitation. Aortic Valve  The aortic valve was trileaflet with normal thickness and cuspal  separation. DOPPLER: Transaortic velocity was within the normal range with  no evidence of aortic stenosis. There was trace aortic regurgitation.     Tricuspid Valve  The tricuspid valve structure was normal with normal leaflet separation. DOPPLER: There was no evidence of tricuspid stenosis. There was no  evidence of tricuspid regurgitation. Pulmonic Valve  The pulmonic valve leaflets were not well seen. DOPPLER: The transpulmonic  velocity was within the normal range with no evidence for regurgitation. Left Atrium  Left atrial size was normal.    Left Ventricle  Normal left ventricular size and systolic function. There were no regional wall motion abnormalities. Wall thickness was within normal limits. Ejection fraction was estimated at 50-55%. Doppler parameters were consistent with abnormal left ventricular  relaxation (grade 1 diastolic dysfunction). Right Atrium  Right atrial size was normal.    Right Ventricle  The right ventricular size was normal with normal systolic function and  wall thickness. Pericardial Effusion  The pericardium was normal in appearance with no evidence of a pericardial  effusion. Pleural Effusion  No evidence of pleural effusion. Aorta / Great Vessels  -Ascending aorta = 4.3 cm. -IVC size is within normal limits with normal respiratory phasic changes.     M-Mode/2D Measurements & Calculations    LV Diastolic   LV Systolic Dimension:    AV Cusp Separation: 1.7 cmLA  Dimension: 5.4 3.7 cm                    Dimension: 2.9 cmAO Root  cm             LV Volume Diastolic: 922  Dimension: 4.4 cmLA Area: 18.8  LV FS:31.5 %   ml                        cm^2  LV PW          LV Volume Systolic: 94.3  Diastolic: 1   ml  cm             LV EDV/LV EDV Index: 141  Septum         ml/61 m^2LV ESV/LV ESV    RV Diastolic Dimension: 3.2 cm  Diastolic: 1   Index: 48.5 ml/25 m^2  cm             EF Calculated: 58.8 %     LA/Aorta: 0.66  Ascending Aorta: 4.3 cm  LA volume/Index: 50.3 ml /22m^2    Doppler Measurements & Calculations    MV Peak E-Wave: 64.3 cm/s  AV Peak Velocity: 173  LVOT Peak Velocity: 168  MV Peak A-Wave: 63 cm/s    cm/s                   cm/s  MV E/A Ratio: 1. 02         AV Peak Gradient:      LVOT Peak Gradient: 11  MV Peak Gradient: 1.65     11.97 mmHg             mmHg  mmHg  TV Peak E-Wave: 30 cm/s  MV Deceleration Time: 273                         TV Peak A-Wave: 37.3  msec                                              cm/s  MV P1/2t: 80 msec  MVA by PHT:2.75 cm^2                              TV Peak Gradient: 0.36  mmHg  MV E' Septal Velocity: 6.6 AV DVI (Vmax):0.97     TR Velocity:223 cm/s  cm/s                                              TR Gradient:19.89 mmHg  MV A' Septal Velocity:                            PV Peak Velocity: 69.8  12.5 cm/s                                         cm/s  MV E' Lateral Velocity:                           PV Peak Gradient: 1.95  8.3 cm/s                                          mmHg  MV A' Lateral Velocity:  15.2 cm/s  E/E' septal: 9.74  E/E' lateral: 7.75  MR Velocity: 595 cm/s    [email or web address] [id number]       All labs, EKG's, diagnostic testing and images as well as cardiac cath, stress testing were reviewed during this encounter    History reviewed. No pertinent past medical history.   Past Surgical History:   Procedure Laterality Date    JOINT REPLACEMENT       Current Facility-Administered Medications   Medication Dose Route Frequency Provider Last Rate Last Admin    levothyroxine (SYNTHROID) tablet 175 mcg  175 mcg Oral BID Omar Drummond MD        sodium chloride flush 0.9 % injection 5-40 mL  5-40 mL IntraVENous 2 times per day Emily Bux, DO   10 mL at 08/03/22 0957    sodium chloride flush 0.9 % injection 5-40 mL  5-40 mL IntraVENous PRN Emily Bux, DO        0.9 % sodium chloride infusion   IntraVENous PRN Emily Bux, DO        ondansetron (ZOFRAN-ODT) disintegrating tablet 4 mg  4 mg Oral Q8H PRN Emily Bux, DO        Or    ondansetron (ZOFRAN) injection 4 mg  4 mg IntraVENous Q6H PRN Emily Bux, DO acetaminophen (TYLENOL) tablet 650 mg  650 mg Oral Q6H PRN Emily Bux, DO        Or    acetaminophen (TYLENOL) suppository 650 mg  650 mg Rectal Q6H PRN Emily Bux, DO        polyethylene glycol (GLYCOLAX) packet 17 g  17 g Oral Daily PRN Emily Bux, DO        aspirin chewable tablet 81 mg  81 mg Oral Daily Emily Bux, DO   81 mg at 08/03/22 0956    atorvastatin (LIPITOR) tablet 40 mg  40 mg Oral Nightly Emily Bux, DO   40 mg at 08/02/22 2022    enoxaparin Sodium (LOVENOX) injection 30 mg  30 mg SubCUTAneous BID Emily Bux, DO   30 mg at 08/03/22 0957    lamoTRIgine (LAMICTAL) tablet 200 mg  200 mg Oral BID Misael Patel MD   200 mg at 08/03/22 7112    lithium capsule 300 mg  300 mg Oral Daily Misael Patel MD   300 mg at 08/03/22 8892     Prior to Admission medications    Medication Sig Start Date End Date Taking? Authorizing Provider   lamoTRIgine (LAMICTAL) 200 MG tablet Take 200 mg by mouth in the morning and at bedtime 7/24/22   Historical Provider, MD   levothyroxine (SYNTHROID) 175 MCG tablet Take 175 mcg by mouth in the morning and at bedtime 7/24/22   Historical Provider, MD   lithium 300 MG capsule Take 300 mg by mouth in the morning. 7/24/22   Historical Provider, MD   simvastatin (ZOCOR) 40 MG tablet Take 40 mg by mouth in the morning. 7/24/22   Historical Provider, MD   Scheduled Meds:   levothyroxine  175 mcg Oral BID    sodium chloride flush  5-40 mL IntraVENous 2 times per day    aspirin  81 mg Oral Daily    atorvastatin  40 mg Oral Nightly    enoxaparin  30 mg SubCUTAneous BID    lamoTRIgine  200 mg Oral BID    lithium  300 mg Oral Daily     Continuous Infusions:   sodium chloride       PRN Meds:.sodium chloride flush, sodium chloride, ondansetron **OR** ondansetron, acetaminophen **OR** acetaminophen, polyethylene glycol    No Known Allergies  History reviewed. No pertinent family history.   Social History     Socioeconomic History    Marital status:      Spouse name: Not on file    Number of children: Not on file    Years of education: Not on file    Highest education level: Not on file   Occupational History    Not on file   Tobacco Use    Smoking status: Every Day     Types: Cigars    Smokeless tobacco: Never   Substance and Sexual Activity    Alcohol use: Yes     Comment: occasionaly    Drug use: Never    Sexual activity: Not on file   Other Topics Concern    Not on file   Social History Narrative    Not on file     Social Determinants of Health     Financial Resource Strain: Not on file   Food Insecurity: Not on file   Transportation Needs: Not on file   Physical Activity: Not on file   Stress: Not on file   Social Connections: Not on file   Intimate Partner Violence: Not on file   Housing Stability: Not on file     ROS:   Constitutional: Denies any recent wt change. Eyes:  Denies any blurring or double vision, no glaucoma  Cardiovascular:  As described above. Respiratory:  Denies any frequent cough, wheezing or coughing up blood  Genitourinary:  Denies difficulty with urination and kidney stones  Gastrointestinal:  Denies any chronic problems with abdominal pain, nausea, vomiting or diarrhea  Musculoskeletal:  Denies any joint pain, back pain, or difficulty walking  Integumentary:  Denies any rash  Neurological:  No numbness or tingling  Endocrine:  Denies any polydipsia. Hematologic/Lymphatic:  Denies any hemorrhage or lymphatic drainage problems. Labs:  CBC:   Recent Labs     08/02/22  0325 08/03/22  0400   WBC 8.0 7.2   HGB 14.4 14.0   HCT 44.5 43.1   MCV 97.4* 98.0*    193     BMP:   Recent Labs     08/02/22  0325 08/03/22  0400    141   K 4.4 4.5    107   CO2 28 26   BUN 20 15   CREATININE 1.2 1.1     Accucheck Glucoses: No results for input(s): POCGLU in the last 72 hours. Cardiac Enzymes: No results for input(s): CKTOTAL, CKMB, CKMBINDEX, TROPONINI in the last 72 hours. PT/INR: No results for input(s): PROTIME, INR in the last 72 hours.   APTT: No results for lamotrigine    Thank you for allowing us to participate in the care of this patient. Please do not hesitate to call us with questions. Electronically signed by Lucia Pina MD, PGY1 on 8/3/2022 at 2:33 PM    Attending Supervising Physician's Attestation Statement  I performed a history and physical examination on the patient and discussed the management with the resident physician. I reviewed and agree with the findings and plan as documented in the resident's note except for as noted below. Atypical chest pain, he is able to exercise quite a bit without much discomfort. He is walking without discomfort. Max pain is 2/10. No LOC. Some diaphoresis. Has HTN, HLD, + smoker. Intermediate to high probability of CAD. Check Cardiolite. He is NPO. If positive will need to be treated as [place], if negative RF management with f/u. Trop elevation was borderline and now negative, ECG is unremarkable. No CHF. No murmurs. Further recommendations based on results and clinical course.       Electronically signed by Mariama Wagoner MD on 8/3/22 at 3:48 PM EDT  Interventional Cardiology - [facility]

## 2022-08-03 NOTE — FLOWSHEET NOTE
08/03/22 1053   Safe Environment   Safety Measures Other (comment)  (Virtual Safety Round Complete)   Patient lying in bed, awake, call light within reach. No safety concerns. Will continue to monitor. Spouse at bedside.

## 2022-08-03 NOTE — PROGRESS NOTES
unchanged unless otherwise stated in hospital course/subjective portion. Medications:  Reviewed    Infusion Medications    sodium chloride       Scheduled Medications    sodium chloride flush  5-40 mL IntraVENous 2 times per day    aspirin  81 mg Oral Daily    atorvastatin  40 mg Oral Nightly    enoxaparin  30 mg SubCUTAneous BID    lamoTRIgine  200 mg Oral BID    lithium  300 mg Oral Daily     PRN Meds: sodium chloride flush, sodium chloride, ondansetron **OR** ondansetron, acetaminophen **OR** acetaminophen, polyethylene glycol        Intake/Output Summary (Last 24 hours) at 8/3/2022 1404  Last data filed at 8/3/2022 0259  Gross per 24 hour   Intake 780 ml   Output 0 ml   Net 780 ml       Exam:  BP (!) 147/71   Pulse 56   Temp 98.4 °F (36.9 °C) (Oral)   Resp 18   Ht 6' 3\" (1.905 m)   Wt 222 lb 0.1 oz (100.7 kg)   SpO2 94%   BMI 27.75 kg/m²     General: No distress, appears stated age. Eyes:  PERRL. Conjunctivae/corneas clear. HENT: Head normal appearing. Nares normal. Oral mucosa moist.  Hearing intact. Neck: Supple, with full range of motion. Trachea midline. No gross JVD appreciated. Respiratory:  Normal effort. Clear to auscultation, without rales or wheezes or rhonchi. Cardiovascular: Normal rate, regular rhythm with normal S1/S2 without murmurs. No lower extremity edema. Abdomen: Soft, non-tender, non-distended with normal bowel sounds. Musculoskeletal: No joint swelling or tenderness. Normal tone. No abnormal movements. Skin: Warm and dry. No rashes or lesions. Neurologic:  No focal sensory/motor deficits in the upper or lower extremities. Cranial nerves:  grossly non-focal 2-12. Psychiatric: Alert and oriented, normal insight and thought content. Capillary Refill: Brisk,< 3 seconds. Peripheral Pulses: +2 palpable, equal bilaterally.        Labs:   Recent Labs     08/02/22  0325 08/03/22  0400   WBC 8.0 7.2   HGB 14.4 14.0   HCT 44.5 43.1    193     Recent Labs 08/02/22  0325 08/03/22  0400    141   K 4.4 4.5    107   CO2 28 26   BUN 20 15   CREATININE 1.2 1.1   CALCIUM 9.7 9.6     Recent Labs     08/02/22  0325   AST 19   ALT 13   BILIDIR <0.2   BILITOT 0.4   ALKPHOS 72     No results for input(s): INR in the last 72 hours. No results for input(s): Perri Mould in the last 72 hours. No results for input(s): PROCAL in the last 72 hours. Lab Results   Component Value Date/Time    NITRU NEGATIVE 08/03/2022 09:40 AM    BLOODU NEGATIVE 08/03/2022 09:40 AM    SPECGRAV 1.012 08/03/2022 09:40 AM       Radiology (48 hours):  XR CHEST PORTABLE    Result Date: 8/2/2022  Minimal linear atelectasis or scarring at the base of the left lung. This document has been electronically signed by: Deb Quevedo MD on 08/02/2022 04:25 AM       DVT prophylaxis:    [x] Lovenox  [] SCDs  [] SQ Heparin  [] Encourage ambulation   [] Already on Anticoagulation       Diet: Diet NPO  Code Status: Full Code  PT/OT: Patient independent  Tele: We will review  IVF: We will start D5 with LR 50 cc. As patient is n.p.o. This transcription was electronically signed. Parts of this transcriptions may have been dictated by use of voice recognition software and electronically transcribed, The transcription may contain errors not detected in proofreading. Norbert Tovar MD  Internal medicine, Hospitalist Service   [facility].

## 2022-08-03 NOTE — CARE COORDINATION
8/3/22, 9:47 AM EDT    DISCHARGE ON GOING EVALUATION    Juancarlos Bose day: 0  Location: [facility]13/013-A Reason for admit: Chest pain [R07.9]  Chest pain, unspecified type [R07.9]   Barriers to Discharge: Await cardio consult for further plans. PCP: Annalee Villa MD   %  Patient Goals/Plan/Treatment Preferences: Home with wife. Denies needs.

## 2022-08-03 NOTE — FLOWSHEET NOTE
08/03/22 0644   Treatment Team Notification   Reason for Communication Evaluate   Team Member Name [facility]   Treatment Team Role Physician Assistant   Method of Communication Secure Message   Response Waiting for response   Notification Time [id number] [phone] From: Aline MOON [facility] [address] RE: Rebecca Reach Dr. Corby Nails requested this patient be seen as soon as possible this morning. Stated his tx plan is waiting for the cardiology eval. Patient was made NPO as of now until after he is seen. The consult was completed yesterday but I dont think he has been seen yet.  Thanks  Unread

## 2022-08-04 ENCOUNTER — APPOINTMENT (OUTPATIENT)
Dept: NON INVASIVE DIAGNOSTICS | Age: 77
DRG: 287 | End: 2022-08-04
Payer: MEDICARE

## 2022-08-04 LAB
ANION GAP SERPL CALCULATED.3IONS-SCNC: 10 MEQ/L (ref 8–16)
BUN BLDV-MCNC: 22 MG/DL (ref 7–22)
CALCIUM SERPL-MCNC: 9.8 MG/DL (ref 8.5–10.5)
CHLORIDE BLD-SCNC: 107 MEQ/L (ref 98–111)
CO2: 24 MEQ/L (ref 23–33)
CREAT SERPL-MCNC: 1.2 MG/DL (ref 0.4–1.2)
GFR SERPL CREATININE-BSD FRML MDRD: 59 ML/MIN/1.73M2
GLUCOSE BLD-MCNC: 98 MG/DL (ref 70–108)
POTASSIUM REFLEX MAGNESIUM: 4.4 MEQ/L (ref 3.5–5.2)
SODIUM BLD-SCNC: 141 MEQ/L (ref 135–145)

## 2022-08-04 PROCEDURE — 6360000002 HC RX W HCPCS

## 2022-08-04 PROCEDURE — 3430000000 HC RX DIAGNOSTIC RADIOPHARMACEUTICAL: Performed by: STUDENT IN AN ORGANIZED HEALTH CARE EDUCATION/TRAINING PROGRAM

## 2022-08-04 PROCEDURE — 96372 THER/PROPH/DIAG INJ SC/IM: CPT

## 2022-08-04 PROCEDURE — 6370000000 HC RX 637 (ALT 250 FOR IP): Performed by: STUDENT IN AN ORGANIZED HEALTH CARE EDUCATION/TRAINING PROGRAM

## 2022-08-04 PROCEDURE — 93016 CV STRESS TEST SUPVJ ONLY: CPT | Performed by: INTERNAL MEDICINE

## 2022-08-04 PROCEDURE — G0378 HOSPITAL OBSERVATION PER HR: HCPCS

## 2022-08-04 PROCEDURE — 80048 BASIC METABOLIC PNL TOTAL CA: CPT

## 2022-08-04 PROCEDURE — 99231 SBSQ HOSP IP/OBS SF/LOW 25: CPT | Performed by: STUDENT IN AN ORGANIZED HEALTH CARE EDUCATION/TRAINING PROGRAM

## 2022-08-04 PROCEDURE — 93018 CV STRESS TEST I&R ONLY: CPT | Performed by: INTERNAL MEDICINE

## 2022-08-04 PROCEDURE — 78452 HT MUSCLE IMAGE SPECT MULT: CPT | Performed by: INTERNAL MEDICINE

## 2022-08-04 PROCEDURE — 6370000000 HC RX 637 (ALT 250 FOR IP): Performed by: INTERNAL MEDICINE

## 2022-08-04 PROCEDURE — 6360000002 HC RX W HCPCS: Performed by: STUDENT IN AN ORGANIZED HEALTH CARE EDUCATION/TRAINING PROGRAM

## 2022-08-04 PROCEDURE — 78452 HT MUSCLE IMAGE SPECT MULT: CPT

## 2022-08-04 PROCEDURE — 36415 COLL VENOUS BLD VENIPUNCTURE: CPT

## 2022-08-04 PROCEDURE — 2580000003 HC RX 258: Performed by: STUDENT IN AN ORGANIZED HEALTH CARE EDUCATION/TRAINING PROGRAM

## 2022-08-04 PROCEDURE — A9500 TC99M SESTAMIBI: HCPCS | Performed by: STUDENT IN AN ORGANIZED HEALTH CARE EDUCATION/TRAINING PROGRAM

## 2022-08-04 PROCEDURE — 1200000000 HC SEMI PRIVATE

## 2022-08-04 PROCEDURE — 93017 CV STRESS TEST TRACING ONLY: CPT | Performed by: INTERNAL MEDICINE

## 2022-08-04 RX ADMIN — Medication 10.5 MILLICURIE: at 08:28

## 2022-08-04 RX ADMIN — SODIUM CHLORIDE, PRESERVATIVE FREE 10 ML: 5 INJECTION INTRAVENOUS at 10:20

## 2022-08-04 RX ADMIN — ASPIRIN 81 MG: 81 TABLET, CHEWABLE ORAL at 10:20

## 2022-08-04 RX ADMIN — LITHIUM CARBONATE 300 MG: 300 CAPSULE ORAL at 21:29

## 2022-08-04 RX ADMIN — LEVOTHYROXINE SODIUM 175 MCG: 0.15 TABLET ORAL at 21:29

## 2022-08-04 RX ADMIN — ENOXAPARIN SODIUM 30 MG: 100 INJECTION SUBCUTANEOUS at 10:20

## 2022-08-04 RX ADMIN — ENOXAPARIN SODIUM 30 MG: 100 INJECTION SUBCUTANEOUS at 21:28

## 2022-08-04 RX ADMIN — LAMOTRIGINE 200 MG: 100 TABLET ORAL at 21:29

## 2022-08-04 RX ADMIN — ATORVASTATIN CALCIUM 40 MG: 40 TABLET, FILM COATED ORAL at 21:31

## 2022-08-04 RX ADMIN — Medication 34.6 MILLICURIE: at 09:21

## 2022-08-04 RX ADMIN — SODIUM CHLORIDE, PRESERVATIVE FREE 10 ML: 5 INJECTION INTRAVENOUS at 21:32

## 2022-08-04 ASSESSMENT — PAIN SCALES - GENERAL: PAINLEVEL_OUTOF10: 0

## 2022-08-04 NOTE — PROGRESS NOTES
Hospitalist Progress Note    Patient:  El Berry    YOB: 1945  Unit/Bed:6K-13/013-A  MRN: [id number]    Acct: [de-identified]   PCP: Giovany Mcduffie MD    Date of Admission: 8/2/2022      Assessment/Plan:  Stable angina. Awaiting stress test results. Patient has few risk factors including age, cigarette smoking, hypothyroidism, hyperlipidemia. Moderate criteria as per heart rate score. Mild troponin elevation. Back to normal.  Echo did not show any wall motion abnormalities. Awaiting results of stress test.  Hypothyroidism. Resumedhome dose of Synthroid 175. TSH within normal limits. Hyperlipidemia. Resumed home dose of statins  Bipolar disorder. Resumed home dose of lithium and Lamictal.  Continue to monitor. Expected discharge date:  8/3/2022    Disposition:   [x] Home  [] TCU  [] Rehab  [] Psych  [] SNF  [] [place]  [] Other-    ===================================================================      Chief Complaint: chest discomfort     Hospital Course: 53-year-old gentleman with past medical history of hyperlipidemia, hypothyroidism, mood disorder came to the hospital yesterday when he was working out and he had some chest discomfort associated with mild shortness of breath. He also had some cold sweat. Denied any radiation of pain to the jaw or neck, 2 out of 10 intensity. Did not take anything to help with the pain. Chest x-ray reviewed unremarkable  Lab work in ED showed sodium 143, potassium 4.4, creatinine 1.2, proBNP 104.7, troponin 0.012, WBC 8. EKG was reviewed which showed some T wave inversions in V1 and V2. Subjective (past 24 hours):   Patient was seen and examined at bedside. No issues overnight. Currently remains chest pain-free. Saturating well on room air. 97 %  Most recent blood pressure 145/71 with heart rate 59  Echocardiogram showed EF of 50 to 55% with grade 1 diastolic dysfunction.   Exam benign  ROS: reviewed complete ROS unchanged unless otherwise stated in hospital course/subjective portion. Medications:  Reviewed    Infusion Medications    sodium chloride       Scheduled Medications    levothyroxine  175 mcg Oral Daily    sodium chloride flush  5-40 mL IntraVENous 2 times per day    aspirin  81 mg Oral Daily    atorvastatin  40 mg Oral Nightly    enoxaparin  30 mg SubCUTAneous BID    lamoTRIgine  200 mg Oral BID    lithium  300 mg Oral Daily     PRN Meds: sodium chloride flush, sodium chloride, ondansetron **OR** ondansetron, acetaminophen **OR** acetaminophen, polyethylene glycol      No intake or output data in the 24 hours ending 08/04/22 1234      Exam:  BP (!) 145/72   Pulse 59   Temp 98.2 °F (36.8 °C) (Oral)   Resp 18   Ht 6' 3\" (1.905 m)   Wt 222 lb 0.1 oz (100.7 kg)   SpO2 97%   BMI 27.75 kg/m²     General: No distress, appears stated age. Eyes:  PERRL. Conjunctivae/corneas clear. HENT: Head normal appearing. Nares normal. Oral mucosa moist.  Hearing intact. Neck: Supple, with full range of motion. Trachea midline. No gross JVD appreciated. Respiratory:  Normal effort. Clear to auscultation, without rales or wheezes or rhonchi. Cardiovascular: Normal rate, regular rhythm with normal S1/S2 without murmurs. No lower extremity edema. Abdomen: Soft, non-tender, non-distended with normal bowel sounds. Musculoskeletal: No joint swelling or tenderness. Normal tone. No abnormal movements. Skin: Warm and dry. No rashes or lesions. Neurologic:  No focal sensory/motor deficits in the upper or lower extremities. Cranial nerves:  grossly non-focal 2-12. Psychiatric: Alert and oriented, normal insight and thought content. Capillary Refill: Brisk,< 3 seconds. Peripheral Pulses: +2 palpable, equal bilaterally.        Labs:   Recent Labs     08/02/22  0325 08/03/22  0400   WBC 8.0 7.2   HGB 14.4 14.0   HCT 44.5 43.1    193       Recent Labs     08/02/22  0325 08/03/22  0400 08/04/22  0410    141 141   K 4.4 4.5 4.4    107 107   CO2 28 26 24   BUN 20 15 22   CREATININE 1.2 1.1 1.2   CALCIUM 9.7 9.6 9.8       Recent Labs     08/02/22  0325   AST 19   ALT 13   BILIDIR <0.2   BILITOT 0.4   ALKPHOS 72       No results for input(s): INR in the last 72 hours. No results for input(s): Ardelle Chalk in the last 72 hours. No results for input(s): PROCAL in the last 72 hours. Lab Results   Component Value Date/Time    NITRU NEGATIVE 08/03/2022 09:40 AM    BLOODU NEGATIVE 08/03/2022 09:40 AM    SPECGRAV 1.012 08/03/2022 09:40 AM       Radiology (48 hours):  XR CHEST PORTABLE    Result Date: 8/2/2022  Minimal linear atelectasis or scarring at the base of the left lung. This document has been electronically signed by: Nancy Mehta MD on 08/02/2022 04:25 AM       DVT prophylaxis:    [x] Lovenox  [] SCDs  [] SQ Heparin  [] Encourage ambulation   [] Already on Anticoagulation       Diet: ADULT DIET; Regular  Code Status: Full Code  PT/OT: Patient independent  Tele: We will review  IVF: dc fluids    This transcription was electronically signed. Parts of this transcriptions may have been dictated by use of voice recognition software and electronically transcribed, The transcription may contain errors not detected in proofreading. Kim Johnson MD  Internal medicine, Hospitalist Service   [address] [place] [address].

## 2022-08-04 NOTE — CARE COORDINATION
8/4/22, 8:52 AM EDT    DISCHARGE ON GOING EVALUATION    Lawson Jackson day: 0  Location: [facility]13/013-A Reason for admit: Chest pain [R07.9]  Chest pain, unspecified type [R07.9]   Barriers to Discharge: Stress test today. Possible discharge after. PCP: Corrie Grant MD   %  Patient Goals/Plan/Treatment Preferences: home with wife. Denies all needs.

## 2022-08-04 NOTE — FLOWSHEET NOTE
08/04/22 1100   Safe Environment   Safety Measures Other (comment)  (Virtual Safety Round Complete)     Patient awake, laying in bed, call light within reach, no safety concerns, will continue to monitor. Visitor at bedside.

## 2022-08-04 NOTE — FLOWSHEET NOTE
08/03/22 2009   Treatment Team Notification   Reason for Communication Medication concern   Team Member Name Dr. Dione Hill Team Role Attending Provider   Method of Communication Secure Message   Response No new orders   Notification Time 2009   Dr. Sophia Nelson reviewed case with no new orders at this time.

## 2022-08-05 ENCOUNTER — APPOINTMENT (OUTPATIENT)
Dept: CARDIAC CATH/INVASIVE PROCEDURES | Age: 77
DRG: 287 | End: 2022-08-05
Payer: MEDICARE

## 2022-08-05 VITALS
DIASTOLIC BLOOD PRESSURE: 80 MMHG | RESPIRATION RATE: 16 BRPM | WEIGHT: 219.58 LBS | OXYGEN SATURATION: 96 % | BODY MASS INDEX: 27.3 KG/M2 | HEIGHT: 75 IN | HEART RATE: 65 BPM | TEMPERATURE: 98.4 F | SYSTOLIC BLOOD PRESSURE: 132 MMHG

## 2022-08-05 PROBLEM — I51.89 DIASTOLIC DYSFUNCTION: Status: ACTIVE | Noted: 2022-08-05

## 2022-08-05 PROBLEM — I21.4 NSTEMI (NON-ST ELEVATED MYOCARDIAL INFARCTION) (HCC): Status: ACTIVE | Noted: 2022-08-05

## 2022-08-05 PROBLEM — R00.1 SINUS BRADYCARDIA BY ELECTROCARDIOGRAM: Status: ACTIVE | Noted: 2022-08-05

## 2022-08-05 LAB
ANION GAP SERPL CALCULATED.3IONS-SCNC: 9 MEQ/L (ref 8–16)
BUN BLDV-MCNC: 19 MG/DL (ref 7–22)
CALCIUM SERPL-MCNC: 10 MG/DL (ref 8.5–10.5)
CHLORIDE BLD-SCNC: 106 MEQ/L (ref 98–111)
CO2: 27 MEQ/L (ref 23–33)
CREAT SERPL-MCNC: 1.3 MG/DL (ref 0.4–1.2)
ERYTHROCYTE [DISTWIDTH] IN BLOOD BY AUTOMATED COUNT: 12.7 % (ref 11.5–14.5)
ERYTHROCYTE [DISTWIDTH] IN BLOOD BY AUTOMATED COUNT: 45.4 FL (ref 35–45)
GFR SERPL CREATININE-BSD FRML MDRD: 54 ML/MIN/1.73M2
GLUCOSE BLD-MCNC: 101 MG/DL (ref 70–108)
HCT VFR BLD CALC: 47.4 % (ref 42–52)
HEMOGLOBIN: 15.3 GM/DL (ref 14–18)
INR BLD: 0.93 (ref 0.85–1.13)
MCH RBC QN AUTO: 31.4 PG (ref 26–33)
MCHC RBC AUTO-ENTMCNC: 32.3 GM/DL (ref 32.2–35.5)
MCV RBC AUTO: 97.3 FL (ref 80–94)
PLATELET # BLD: 204 THOU/MM3 (ref 130–400)
PMV BLD AUTO: 10.2 FL (ref 9.4–12.4)
POTASSIUM REFLEX MAGNESIUM: 4.8 MEQ/L (ref 3.5–5.2)
POTASSIUM SERPL-SCNC: 4.8 MEQ/L (ref 3.5–5.2)
RBC # BLD: 4.87 MILL/MM3 (ref 4.7–6.1)
SODIUM BLD-SCNC: 142 MEQ/L (ref 135–145)
WBC # BLD: 8 THOU/MM3 (ref 4.8–10.8)

## 2022-08-05 PROCEDURE — 93458 L HRT ARTERY/VENTRICLE ANGIO: CPT | Performed by: INTERNAL MEDICINE

## 2022-08-05 PROCEDURE — 6360000002 HC RX W HCPCS

## 2022-08-05 PROCEDURE — 6360000004 HC RX CONTRAST MEDICATION: Performed by: STUDENT IN AN ORGANIZED HEALTH CARE EDUCATION/TRAINING PROGRAM

## 2022-08-05 PROCEDURE — 93005 ELECTROCARDIOGRAM TRACING: CPT | Performed by: INTERNAL MEDICINE

## 2022-08-05 PROCEDURE — 80048 BASIC METABOLIC PNL TOTAL CA: CPT

## 2022-08-05 PROCEDURE — 6360000002 HC RX W HCPCS: Performed by: STUDENT IN AN ORGANIZED HEALTH CARE EDUCATION/TRAINING PROGRAM

## 2022-08-05 PROCEDURE — 2580000003 HC RX 258: Performed by: INTERNAL MEDICINE

## 2022-08-05 PROCEDURE — 4A023N7 MEASUREMENT OF CARDIAC SAMPLING AND PRESSURE, LEFT HEART, PERCUTANEOUS APPROACH: ICD-10-PCS | Performed by: INTERNAL MEDICINE

## 2022-08-05 PROCEDURE — C1769 GUIDE WIRE: HCPCS

## 2022-08-05 PROCEDURE — C1894 INTRO/SHEATH, NON-LASER: HCPCS

## 2022-08-05 PROCEDURE — 2500000003 HC RX 250 WO HCPCS

## 2022-08-05 PROCEDURE — 93458 L HRT ARTERY/VENTRICLE ANGIO: CPT

## 2022-08-05 PROCEDURE — 85027 COMPLETE CBC AUTOMATED: CPT

## 2022-08-05 PROCEDURE — B2151ZZ FLUOROSCOPY OF LEFT HEART USING LOW OSMOLAR CONTRAST: ICD-10-PCS | Performed by: INTERNAL MEDICINE

## 2022-08-05 PROCEDURE — 99238 HOSP IP/OBS DSCHRG MGMT 30/<: CPT | Performed by: STUDENT IN AN ORGANIZED HEALTH CARE EDUCATION/TRAINING PROGRAM

## 2022-08-05 PROCEDURE — 36415 COLL VENOUS BLD VENIPUNCTURE: CPT

## 2022-08-05 PROCEDURE — 6370000000 HC RX 637 (ALT 250 FOR IP): Performed by: STUDENT IN AN ORGANIZED HEALTH CARE EDUCATION/TRAINING PROGRAM

## 2022-08-05 PROCEDURE — 85610 PROTHROMBIN TIME: CPT

## 2022-08-05 PROCEDURE — B2111ZZ FLUOROSCOPY OF MULTIPLE CORONARY ARTERIES USING LOW OSMOLAR CONTRAST: ICD-10-PCS | Performed by: INTERNAL MEDICINE

## 2022-08-05 RX ORDER — SODIUM CHLORIDE 9 MG/ML
INJECTION, SOLUTION INTRAVENOUS CONTINUOUS
Status: CANCELLED | OUTPATIENT
Start: 2022-08-05

## 2022-08-05 RX ORDER — SODIUM CHLORIDE 0.9 % (FLUSH) 0.9 %
5-40 SYRINGE (ML) INJECTION EVERY 12 HOURS SCHEDULED
Status: CANCELLED | OUTPATIENT
Start: 2022-08-05

## 2022-08-05 RX ORDER — SODIUM CHLORIDE 0.9 % (FLUSH) 0.9 %
5-40 SYRINGE (ML) INJECTION EVERY 12 HOURS SCHEDULED
Status: DISCONTINUED | OUTPATIENT
Start: 2022-08-05 | End: 2022-08-05 | Stop reason: HOSPADM

## 2022-08-05 RX ORDER — SODIUM CHLORIDE 9 MG/ML
INJECTION, SOLUTION INTRAVENOUS PRN
Status: DISCONTINUED | OUTPATIENT
Start: 2022-08-05 | End: 2022-08-05 | Stop reason: HOSPADM

## 2022-08-05 RX ORDER — ASPIRIN 81 MG/1
81 TABLET, CHEWABLE ORAL DAILY
Qty: 30 TABLET | Refills: 3 | Status: SHIPPED | OUTPATIENT
Start: 2022-08-06

## 2022-08-05 RX ORDER — ACETAMINOPHEN 325 MG/1
650 TABLET ORAL EVERY 4 HOURS PRN
Status: CANCELLED | OUTPATIENT
Start: 2022-08-05

## 2022-08-05 RX ORDER — SODIUM CHLORIDE 9 MG/ML
INJECTION, SOLUTION INTRAVENOUS PRN
Status: CANCELLED | OUTPATIENT
Start: 2022-08-05

## 2022-08-05 RX ORDER — LISINOPRIL 2.5 MG/1
2.5 TABLET ORAL DAILY
Qty: 30 TABLET | Refills: 1 | Status: SHIPPED | OUTPATIENT
Start: 2022-08-05 | End: 2022-09-08 | Stop reason: SDUPTHER

## 2022-08-05 RX ORDER — SODIUM CHLORIDE 0.9 % (FLUSH) 0.9 %
5-40 SYRINGE (ML) INJECTION PRN
Status: CANCELLED | OUTPATIENT
Start: 2022-08-05

## 2022-08-05 RX ORDER — SODIUM CHLORIDE 0.9 % (FLUSH) 0.9 %
5-40 SYRINGE (ML) INJECTION PRN
Status: DISCONTINUED | OUTPATIENT
Start: 2022-08-05 | End: 2022-08-05 | Stop reason: HOSPADM

## 2022-08-05 RX ADMIN — IOPAMIDOL 40 ML: 755 INJECTION, SOLUTION INTRAVENOUS at 15:38

## 2022-08-05 RX ADMIN — ENOXAPARIN SODIUM 30 MG: 100 INJECTION SUBCUTANEOUS at 08:52

## 2022-08-05 RX ADMIN — SODIUM CHLORIDE, PRESERVATIVE FREE 10 ML: 5 INJECTION INTRAVENOUS at 08:52

## 2022-08-05 RX ADMIN — ASPIRIN 81 MG: 81 TABLET, CHEWABLE ORAL at 08:52

## 2022-08-05 NOTE — FLOWSHEET NOTE
08/05/22 1451   Safe Environment   Safety Measures Other (comment)  (Virtual Safety Round Complete)   Virtual nurse rounds, pt did not respond to audio, so camera turned on to visually check safety of patient.   Patient and bed not in room at this time

## 2022-08-05 NOTE — DISCHARGE INSTRUCTIONS
Maintain healthy life style. Eat and drink well. Started you on lisinopril low dose of 2.5, which is cardioprotective medication but can also bring the blood pressure down. Please stop taking it if you feel dizzy or you get any rash. Dry cough is one of the side effect. Continue aspirin and lipitor. Follow up with your PCP.

## 2022-08-05 NOTE — FLOWSHEET NOTE
08/05/22 1102   Safe Environment   Safety Measures Other (comment)  (Virtual Safety Round Complete)   Virtual RN rounds, patient denies needs at this time, family at bedside, call light within reach

## 2022-08-05 NOTE — PROGRESS NOTES
Hospitalist Progress Note    Patient:  Eva Aranda    YOB: 1945  Unit/Bed:6K-13/013-A  MRN: [id number]    Acct: [de-identified]   PCP: Jamison Nichols MD    Date of Admission: 8/2/2022      Assessment/Plan:  NSTEMI likely type 1- Stress test concerning for ischemia- Scheduled for cardiac cath this afternoon. Risk factor modification. Echo did not show any wall motion abnormalities. Continue aspirin and Lipitor. Sinus bradycardia with first degree AV block EKG reviewed- asymptomatic, avoid BB. Hypothyroidism. Resumed home dose of Synthroid 175. TSH within normal limits. Hyperlipidemia. Resumed home dose of statins  Bipolar disorder. Resumed home dose of lithium and Lamictal.  Continue to monitor. Expected discharge date:  8/3/2022    Disposition:   [x] Home  [] TCU  [] Rehab  [] Psych  [] SNF  [] [place]  [] Other-    ===================================================================      Chief Complaint: chest discomfort     Hospital Course: 68-year-old gentleman with past medical history of hyperlipidemia, hypothyroidism, mood disorder came to the hospital yesterday when he was working out and he had some chest discomfort associated with mild shortness of breath. He also had some cold sweat. Denied any radiation of pain to the jaw or neck, 2 out of 10 intensity. Did not take anything to help with the pain. Chest x-ray reviewed unremarkable  Lab work in ED showed sodium 143, potassium 4.4, creatinine 1.2, proBNP 104.7, troponin 0.012, WBC 8. EKG was reviewed which showed some T wave inversions in V1 and V2. Subjective (past 24 hours):   Patient was seen and examined at bedside. No issues overnight. Currently remains chest pain-free. Patient denied any fever, chills, nausea or vomiting. Saturating well on room air. 96%  Most recent blood pressure 104/67 with heart rate 57  Echocardiogram showed EF of 50 to 55% with grade 1 diastolic dysfunction.   Exam benign  ROS: reviewed complete ROS unchanged unless otherwise stated in hospital course/subjective portion. Medications:  Reviewed    Infusion Medications    sodium chloride      sodium chloride       Scheduled Medications    sodium chloride flush  5-40 mL IntraVENous 2 times per day    levothyroxine  175 mcg Oral Daily    aspirin  81 mg Oral Daily    atorvastatin  40 mg Oral Nightly    enoxaparin  30 mg SubCUTAneous BID    lamoTRIgine  200 mg Oral BID    lithium  300 mg Oral Daily     PRN Meds: sodium chloride flush, sodium chloride, sodium chloride, ondansetron **OR** ondansetron, acetaminophen **OR** acetaminophen, polyethylene glycol        Intake/Output Summary (Last 24 hours) at 8/5/2022 1310  Last data filed at 8/4/2022 2125  Gross per 24 hour   Intake 300 ml   Output --   Net 300 ml         Exam:  /67   Pulse 57   Temp 98.4 °F (36.9 °C) (Oral)   Resp 18   Ht 6' 3\" (1.905 m)   Wt 219 lb 9.3 oz (99.6 kg)   SpO2 96%   BMI 27.45 kg/m²     General: No distress, appears stated age. Eyes:  PERRL. Conjunctivae/corneas clear. HENT: Head normal appearing. Nares normal. Oral mucosa moist.  Hearing intact. Neck: Supple, with full range of motion. Trachea midline. No gross JVD appreciated. Respiratory:  Normal effort. Clear to auscultation, without rales or wheezes or rhonchi. Cardiovascular: Normal rate, regular rhythm with normal S1/S2 without murmurs. No lower extremity edema. Abdomen: Soft, non-tender, non-distended with normal bowel sounds. Musculoskeletal: No joint swelling or tenderness. Normal tone. No abnormal movements. Skin: Warm and dry. No rashes or lesions. Neurologic:  No focal sensory/motor deficits in the upper or lower extremities. Cranial nerves:  grossly non-focal 2-12. Psychiatric: Alert and oriented, normal insight and thought content. Capillary Refill: Brisk,< 3 seconds. Peripheral Pulses: +2 palpable, equal bilaterally.        Labs:   Recent Labs 08/03/22  0400 08/05/22  0532   WBC 7.2 8.0   HGB 14.0 15.3   HCT 43.1 47.4    204       Recent Labs     08/03/22  0400 08/04/22  0410 08/05/22  0532    141 142   K 4.5 4.4 4.8  4.8    107 106   CO2 26 24 27   BUN 15 22 19   CREATININE 1.1 1.2 1.3*   CALCIUM 9.6 9.8 10.0       No results for input(s): AST, ALT, BILIDIR, BILITOT, ALKPHOS in the last 72 hours. Recent Labs     08/05/22  0532   INR 0.93     No results for input(s): Bhakti McDonald in the last 72 hours. No results for input(s): PROCAL in the last 72 hours. Lab Results   Component Value Date/Time    NITRU NEGATIVE 08/03/2022 09:40 AM    BLOODU NEGATIVE 08/03/2022 09:40 AM    SPECGRAV 1.012 08/03/2022 09:40 AM       Radiology (48 hours):  XR CHEST PORTABLE    Result Date: 8/2/2022  Minimal linear atelectasis or scarring at the base of the left lung. This document has been electronically signed by: Ned Hooker MD on 08/02/2022 04:25 AM       DVT prophylaxis:    [x] Lovenox  [] SCDs  [] SQ Heparin  [] Encourage ambulation   [] Already on Anticoagulation       Diet: Diet NPO Exceptions are: Sips of Water with Meds  Code Status: Full Code  PT/OT: Patient independent  Tele: We will review  IVF: dc fluids    This transcription was electronically signed. Parts of this transcriptions may have been dictated by use of voice recognition software and electronically transcribed, The transcription may contain errors not detected in proofreading. Dominique Cuellar MD  Internal medicine, Hospitalist Service   [facility].

## 2022-08-05 NOTE — BRIEF OP NOTE
[facility]  Sedation/Analgesia Post Sedation Record        Pt Name: Jose Camacho  MRN: [id number]  YOB: 1945  Procedure Performed By: Ann Marie Davis MD MD, Heath Wiseman, [address]  Primary Care Physician: Celsa Fay MD    POST-PROCEDURE    Sedation/Anesthesia:  Local Anesthesia and IV Conscious Sedation with continuous O2 monitoring    Estimated Blood Loss: 10 cc     Specimens Removed:  [x]None []Other:      Disposition of Specimen:  []Pathology []Other        Complications:   [x]None Immediate []Other:       Procedure performed: Left heart cath    Post Procedure Diagnosis/Findings:  Patent coronaries       Recommendations:    Medical treatment  Routine post-cath care.                Ann Marie Davis MD MD, Heath Wiseman, [address]  Electronically signed 8/5/2022 at 3:56 PM

## 2022-08-05 NOTE — PROGRESS NOTES
2007 msg sent to Dr Obed Conway regarding no orders signed and held for cath lab in morning    2010 order for pre cath orders    [phone] called cath lab, will add pt on for L heart cath this afternoon

## 2022-08-05 NOTE — FLOWSHEET NOTE
08/05/22 [place] Measures Other (comment)  (Virtual Safety Round Complete)   Virtual RN rounds, patient denies needs at this time, family at bedside, call light within reach

## 2022-08-05 NOTE — CARE COORDINATION
8/5/22, 9:40 AM EDT    DISCHARGE ON GOING EVALUATION    Kirill Tomas day: 1  Location: [facility]13/013-A Reason for admit: Chest pain [R07.9]  Chest pain, unspecified type [R07.9]  Stable angina ([address] [I20.8]   Procedure: 8/4 stress test  Barriers to Discharge: plan heart cath today  PCP: Gisell Guerrier MD  Readmission Risk Score: 6.9%  Patient Goals/Plan/Treatment Preferences: home with wife. Denies needs. 8/5/22, 9:41 AM EDT    Anticipate discharge tonight/tomorrow. Patient goals/plan/ treatment preferences discussed by  and . Patient goals/plan/ treatment preferences reviewed with patient/ family. Patient/ family verbalize understanding of discharge plan and are in agreement with goal/plan/treatment preferences. Understanding was demonstrated using the teach back method. AVS provided by RN at time of discharge, which includes all necessary medical information pertaining to the patients current course of illness, treatment, post-discharge goals of care, and treatment preferences.      Services At/After Discharge: None       IMM Letter  IMM Letter given to Patient/Family/Significant other/Guardian/POA/by[de-identified] Suzanne Alexis CM  IMM Letter date given[de-identified] 08/04/22  IMM Letter time given[de-identified] 1440  Observation Status Letter date given[de-identified] 08/02/22  Observation Status Letter time given[de-identified] 0451  Observation Status Letter given to Patient/Family/Significant other/Guardian/POA/by[de-identified] staff

## 2022-08-05 NOTE — PROGRESS NOTES
[address] [address]  Sedation/Analgesia History & Physical    Pt Name: Nirmala Sanabria  Account number: [de-identified]  MRN: [id number]  YOB: 1945  Provider Performing Procedure: Tyson Lazo MD MD [facility]  Primary Care Physician: Charlaine Claude, MD  Date: 8/5/2022    PRE-PROCEDURE    Code Status: FULL CODE  Brief History/Pre-Procedure Diagnosis: angina iii     Consent: : I have discussed with the patient risks, benefits, and alternatives (and relevant risks, benefits, and side effects related to alternatives or not receiving care), and likelihood of the success. The patient and/or representative appear to understand and agree to proceed. The discussion encompasses risks, benefits, and side effects related to the alternatives and the risks related to not receiving the proposed care, treatment, and services. PLANNED PROCEDURE   [x]Cath  [x]PCI                []Pacemaker/AICD  []RICH             []Cardioversion []Peripheral angiography/PTA  []Other:      VITAL SIGNS   Vitals:    08/05/22 0845   BP: 104/67   Pulse: 57   Resp: 18   Temp: 98.4 °F (36.9 °C)   SpO2: 96%       PHYSICAL:   General: No acute distress  HEENT:  Unremarkable for age  Neck: without increased JVD, carotid pulses 2+ bilaterally without bruits  Heart: RRR, S1 & S2 WNL   Lungs: Clear to auscultation    Abdomen: BS present, without HSM, masses, or tenderness    Extremities: without C,C,E.  Pulses 2+ bilaterally  Mental Status: Alert & Oriented    SEDATION  Planned agent:[x]Midazolam []Meperidine []Sublimaze []Morphine  []Diazepam  [x]Other: fentanyl      ASA Classification:  []1 []2 [x]3 []4 []5  Class 1: A normal healthy patient  Class 2: Pt with mild to moderate systemic disease  Class 3: Severe systemic disease or disturbance  Class 4: Severe systemic disorders that are already life threatening. Class 5: Moribund pt with little chances of survival, for more than 24 hours.   Mallampati I Airway Classification:   []1 []2 [x]3 []4          MEDICAL HISTORY   has no past medical history on file. []Additional information:          SURGICAL HISTORY   has a past surgical history that includes joint replacement. Additional information:       ALLERGIES   Allergies as of 08/02/2022    (No Known Allergies)     Additional information:       MEDICATIONS     Current Facility-Administered Medications:     sodium chloride flush 0.9 % injection 5-40 mL, 5-40 mL, IntraVENous, 2 times per day, Norma Vale MD, 10 mL at 08/05/22 0852    sodium chloride flush 0.9 % injection 5-40 mL, 5-40 mL, IntraVENous, PRN, Norma Vale MD    0.9 % sodium chloride infusion, , IntraVENous, PRN, Norma Vale MD    levothyroxine (SYNTHROID) tablet 175 mcg, 175 mcg, Oral, Daily, Nikole Millan MD, 175 mcg at 08/04/22 2129    0.9 % sodium chloride infusion, , IntraVENous, PRN, Emily Bux, DO    ondansetron (ZOFRAN-ODT) disintegrating tablet 4 mg, 4 mg, Oral, Q8H PRN **OR** ondansetron (ZOFRAN) injection 4 mg, 4 mg, IntraVENous, Q6H PRN, Emily Bux, DO    acetaminophen (TYLENOL) tablet 650 mg, 650 mg, Oral, Q6H PRN **OR** acetaminophen (TYLENOL) suppository 650 mg, 650 mg, Rectal, Q6H PRN, Emily Bux, DO    polyethylene glycol (GLYCOLAX) packet 17 g, 17 g, Oral, Daily PRN, Emily Bux, DO    aspirin chewable tablet 81 mg, 81 mg, Oral, Daily, Emily Bux, DO, 81 mg at 08/05/22 1446    atorvastatin (LIPITOR) tablet 40 mg, 40 mg, Oral, Nightly, Emily Bux, DO, 40 mg at 08/04/22 2131    enoxaparin Sodium (LOVENOX) injection 30 mg, 30 mg, SubCUTAneous, BID, Emily Bux, DO, 30 mg at 08/05/22 2026    lamoTRIgine (LAMICTAL) tablet 200 mg, 200 mg, Oral, BID, Ivana Eugene MD, 200 mg at 08/04/22 2129    lithium capsule 300 mg, 300 mg, Oral, Daily, Ivana Eugene MD, 300 mg at 08/04/22 2129  Prior to Admission medications    Medication Sig Start Date End Date Taking?  Authorizing Provider   lamoTRIgine (LAMICTAL) 200 MG tablet Take 200 mg by mouth in the morning and at bedtime 7/24/22   Historical Provider, MD   levothyroxine (SYNTHROID) 175 MCG tablet Take 175 mcg by mouth in the morning and at bedtime 7/24/22   Historical Provider, MD   lithium 300 MG capsule Take 300 mg by mouth in the morning. 7/24/22   Historical Provider, MD   simvastatin (ZOCOR) 40 MG tablet Take 40 mg by mouth in the morning. 7/24/22   Historical Provider, MD     Additional information:                 Brayan Hernandez MD MD [facility]  Electronically signed 8/5/2022 at 3:19 PM

## 2022-08-05 NOTE — PROCEDURES
[address] [place] [address]                            CARDIAC CATHETERIZATION    PATIENT NAME: Callie Escobar                      :        1945  MED REC NO:   [id number]                           ROOM:       [id number]  ACCOUNT NO:   [de-identified]                           ADMIT DATE: 2022  PROVIDER:     Luis Dorado MD    DATE OF PROCEDURE:  2022    PROCEDURE PERFORMED:  Coronary angiogram, LV-gram.    INDICATION FOR STUDY:  Chest pain and abnormal stress test.    DESCRIPTION OF PROCEDURE:  After written informed consent was obtained,  the patient was brought to the cardiac catheterization laboratory in a  fasting, nonsedated state. Preprocedure timeout was performed. 2%  lidocaine was used to anesthetize the subcutaneous tissue overlying the  right radial artery. Using the modified Seldinger technique, a 6-Romansh  Slender arterial sheath was placed in the right radial artery. Once the  sheath was in place, a radial cocktail was given to reduce radial artery  spasm/occlusion. ESTIMATED BLOOD LOSS:  Less than 30 mL. EQUIPMENTS USED:  Standard 5-Romansh JR4, JL3.5 diagnostic catheters. MEDICATIONS:  Please see EMR. CORONARY ANGIOGRAM:  1. Right coronary artery is large and dominant vessel. There is no  significant disease in the proximal, mid and distal portions of vessel. Distally, there is a moderate-sized PDA and a large-sized PL branches,  both of which are patent with mild luminal irregularities. 2.  Left main is large, gives rise to LAD and left circumflex arteries. 3.  Left circumflex artery is patent with mild luminal irregularities in  proximal, mid and distal portions of the vessel. 4. LAD is patent in the proximal portion. Mid and distal portions have  mild luminal irregularities, otherwise overall patent. The patient tolerated the procedure well. There were no complications.    His LVEDP was measured to be 8 mmHg, LV systolic function was estimated  at 60%. There is no significant gradient across the aortic valve. Hemostasis of the right radial artery was achieved with a Vasc Band  device and he was transferred back to his room in stable condition. SUMMARY:  1. Patent coronary arteries. 2.  LV systolic function was estimated at 60% with LVEDP measurement of  8 mmHg. RECOMMENDATIONS:  1. Aggressive risk factor modification. 2.  Lipid-lowering therapy. 3.  IV fluids. 4.  Routine post-cath care. All procedure details and management plans were discussed in detail with  the patient and family members, and they were in agreement with the  plan.         Benny Otto MD    D: 08/05/2022 15:47:18       T: 08/05/2022 16:22:11     [email or web address]  Job#: [id number]     Doc#: [id number]    CC:

## 2022-08-06 LAB
EKG ATRIAL RATE: 59 BPM
EKG P AXIS: 75 DEGREES
EKG P-R INTERVAL: 232 MS
EKG Q-T INTERVAL: 438 MS
EKG QRS DURATION: 160 MS
EKG QTC CALCULATION (BAZETT): 433 MS
EKG R AXIS: -49 DEGREES
EKG T AXIS: 30 DEGREES
EKG VENTRICULAR RATE: 59 BPM

## 2022-08-06 PROCEDURE — 93010 ELECTROCARDIOGRAM REPORT: CPT | Performed by: INTERNAL MEDICINE

## 2022-08-06 NOTE — PROGRESS NOTES
Discharge teaching and instructions for diagnosis/procedure of chest pain completed with patient using teachback method. AVS reviewed. Printed prescriptions given to patient. Patient voiced understanding regarding prescriptions, follow up appointments, and care of self at home. Discharged ambulatory to  home with support per  wife. 41 y/o F with PMH RA, SLE on prednisone (recently increased to 40mg, hydroxychloroquine), fibromyalgia, h/o PE on coumadin presents with husbands for syncopal episode while talking to  sitting on bed. unconscious x seconds. +L CP earlier today. pt did have n/v yesterday and some this AM.  Loose stools. No CP now. No sob. currently menstruating. Denies palpitations, back pain, abdominal pain, n/v/d, fevers, sweats, chills, trauma, fall, cough, recent travel, recent illness, sick contacts, leg pain/swelling, urinary symptoms, rash.

## 2022-08-07 PROBLEM — R07.89 ATYPICAL CHEST PAIN: Status: ACTIVE | Noted: 2022-08-02

## 2022-08-07 NOTE — DISCHARGE SUMMARY
Hospital Medicine Discharge Summary      Patient Identification:   Nirmala Sanabria   : 1945  MRN: [id number]   Account: [de-identified]      Patient's PCP: Charlaine Claude, MD    Admit Date: 2022     Discharge Date: 2022      Admitting Physician: Jayna Sweeney MD     Discharge Physician: Jayna Sweeney MD       Hospital Course:   82-year-old gentleman with past medical history of hyperlipidemia, hypothyroidism, mood disorder came to the hospital yesterday when he was working out and he had some chest discomfort associated with mild shortness of breath. He also had some cold sweat. Denied any radiation of pain to the jaw or neck, 2 out of 10 intensity. Did not take anything to help with the pain. Chest x-ray reviewed unremarkable  Lab work in ED showed sodium 143, potassium 4.4, creatinine 1.2, proBNP 104.7, troponin 0.012, WBC 8. EKG was reviewed which showed some T wave inversions in V1 and V2. Patient underwent cardiac stress test which was abnormal eventually went to cardiac cath which did not reveal any obstructive lesion. Patient was counseled to change lifestyle and for risk factor modification. Discharge Diagnoses:  NSTEMI likely type 1- Stress test concerning for ischemia- Scheduled for cardiac cath this afternoon. Cardiac cath negative. Risk factor modification. Echo did not show any wall motion abnormalities. Continue aspirin and Lipitor. Sinus bradycardia with first degree AV block EKG reviewed- asymptomatic, avoid BB. Hypothyroidism. Resumed home dose of Synthroid 175. TSH within normal limits. Hyperlipidemia. Resumed home dose of statins  Bipolar disorder. Resumed home dose of lithium and Lamictal.  Continue to monitor. The patient was seen and examined on day of discharge and this discharge summary is in conjunction with any daily progress note from day of discharge. The patient is discharged in stable condition.      Exam:     Vitals:  Vitals:    22 1630 08/05/22 1700 08/05/22 1730 08/05/22 1830   BP: 136/88 125/87 130/76 132/80   Pulse: 66 64 66 65   Resp: 18 16 18 16   Temp:       TempSrc:       SpO2: 96% 95% 96%    Weight:       Height:         Weight: Weight: 219 lb 9.3 oz (99.6 kg)     24 hour intake/output:No intake or output data in the 24 hours ending 08/07/22 1950    General appearance: No apparent distress, well developed, appears stated age. Eyes:  Pupils equal, round, and reactive to light. Conjunctivae/corneas clear. HENT: Head normal in appearance. External nares normal.  Oral mucosa moist without lesions. Hearing grossly intact. Neck: Supple, with full range of motion. Trachea midline. No gross JVD appreciated. Respiratory:  Normal respiratory effort. Clear to auscultation, bilaterally without rales or wheezes or rhonchi. Cardiovascular: Normal rate, regular rhythm with normal S1/S2 without murmurs. No lower extremity edema. Abdomen: Soft, non-tender, non-distended with normal bowel sounds. Musculoskeletal: There is no joint swelling or tenderness. Normal tone. No abnormal movements. Skin: Warm and dry. No rashes or lesions. Neurologic:  No focal sensory/motor deficits in the upper and lower extremities. Cranial nerves:  grossly non-focal 2-12. Psychiatric: Alert and oriented, normal insight and thought content. Capillary Refill: Brisk,< 3 seconds. Peripheral Pulses: +2 palpable, equal bilaterally. Labs:  For convenience and continuity at follow-up the following most recent labs are provided:    CBC:    Lab Results   Component Value Date/Time    WBC 8.0 08/05/2022 05:32 AM    HGB 15.3 08/05/2022 05:32 AM    HCT 47.4 08/05/2022 05:32 AM     08/05/2022 05:32 AM       Renal:    Lab Results   Component Value Date/Time     08/05/2022 05:32 AM    K 4.8 08/05/2022 05:32 AM    K 4.8 08/05/2022 05:32 AM     08/05/2022 05:32 AM    CO2 27 08/05/2022 05:32 AM    BUN 19 08/05/2022 05:32 AM    CREATININE 1.3 08/05/2022 05:32 AM    CALCIUM 10.0 08/05/2022 05:32 AM         Significant Diagnostic Studies    Radiology:   XR CHEST PORTABLE   Final Result   Minimal linear atelectasis or scarring at the base of the left lung. This document has been electronically signed by: Rio Silva MD on    08/02/2022 04:25 AM             Consults:     IP CONSULT TO CARDIOLOGY    Disposition: Home  Condition at Discharge: Stable    Code Status:  Prior     Patient Instructions:    Discharge lab work: None  Activity: activity as tolerated  Diet: No diet orders on file      Follow-up visits:   Ariana Nicholas MD  [address] [address] [phone]    Follow up on 8/9/2022  appointment time 10:00am., Arrive 15 min early, Please wear a mask, Bring insurance and ID, Please bring any prescriptions you are taking,co-pay. Corey Garcia MD  [place] [address] [address]  [phone]    Follow up in 3 week(s)  For hospital follow-up         Discharge Medications:        Medication List        START taking these medications      aspirin 81 MG chewable tablet  Take 1 tablet by mouth in the morning. lisinopril 2.5 MG tablet  Commonly known as: PRINIVIL;ZESTRIL  Take 1 tablet by mouth in the morning. CONTINUE taking these medications      lamoTRIgine 200 MG tablet  Commonly known as: LAMICTAL     levothyroxine 175 MCG tablet  Commonly known as: SYNTHROID     lithium 300 MG capsule     simvastatin 40 MG tablet  Commonly known as: ZOCOR               Where to Get Your Medications        These medications were sent to [address] [place] [address] [id number] - [place] [phone]  . [facility], [address] [facility] [address]      Phone: [phone]   aspirin 81 MG chewable tablet  lisinopril 2.5 MG tablet          Patient was a started on low-dose lisinopril 2.5 for cardioprotection.       Time Spent on discharge is  25 minutes in the examination, evaluation, counseling and review of medications and discharge plan. Thank you Celsa Fay MD for the opportunity to be involved in this patient's care.       Signed:    Electronically signed by Gibran Slade MD on 8/7/22 at 7:50 PM EDT

## 2022-09-08 ENCOUNTER — OFFICE VISIT (OUTPATIENT)
Dept: CARDIOLOGY CLINIC | Age: 77
End: 2022-09-08
Payer: MEDICARE

## 2022-09-08 VITALS
HEART RATE: 78 BPM | BODY MASS INDEX: 28.27 KG/M2 | WEIGHT: 227.4 LBS | DIASTOLIC BLOOD PRESSURE: 72 MMHG | SYSTOLIC BLOOD PRESSURE: 150 MMHG | HEIGHT: 75 IN

## 2022-09-08 DIAGNOSIS — R07.9 CHEST PAIN, UNSPECIFIED TYPE: Primary | ICD-10-CM

## 2022-09-08 PROCEDURE — 99213 OFFICE O/P EST LOW 20 MIN: CPT | Performed by: INTERNAL MEDICINE

## 2022-09-08 PROCEDURE — 4004F PT TOBACCO SCREEN RCVD TLK: CPT | Performed by: INTERNAL MEDICINE

## 2022-09-08 PROCEDURE — G8417 CALC BMI ABV UP PARAM F/U: HCPCS | Performed by: INTERNAL MEDICINE

## 2022-09-08 PROCEDURE — 1123F ACP DISCUSS/DSCN MKR DOCD: CPT | Performed by: INTERNAL MEDICINE

## 2022-09-08 PROCEDURE — 3017F COLORECTAL CA SCREEN DOC REV: CPT | Performed by: INTERNAL MEDICINE

## 2022-09-08 PROCEDURE — G8427 DOCREV CUR MEDS BY ELIG CLIN: HCPCS | Performed by: INTERNAL MEDICINE

## 2022-09-08 RX ORDER — LISINOPRIL 2.5 MG/1
2.5 TABLET ORAL DAILY
Qty: 30 TABLET | Refills: 11 | Status: SHIPPED | OUTPATIENT
Start: 2022-09-08

## 2022-09-08 NOTE — PROGRESS NOTES
[id number] [facility] [place] [address] [facility] [address]  Dept: [phone]  Dept Fax: [phone]  Loc: [phone]    Visit Date: 9/8/2022    Mr. Kourtney Madsen is a 78 y.o. male  who presented for:  Chief Complaint   Patient presents with    Follow-Up from Hospital    Chest Pain       HPI:   HPI    78 y.o. male with a history of HLD, hyperthyroidism s/p irradiation on levothyroxine, and bipolar disorder who presents for follow-up. Had chest pain admit. Stress negative. Coronary angiogram is negative. EF 60%. No chest pain, angina, COON, orthopnea, PND, sob at rest, palpitations, LE edema, or syncope. Can do all ADLs. Able to exercise again. No discomfort. Current Outpatient Medications:     aspirin 81 MG chewable tablet, Take 1 tablet by mouth in the morning., Disp: 30 tablet, Rfl: 3    lamoTRIgine (LAMICTAL) 200 MG tablet, Take 200 mg by mouth daily, Disp: , Rfl:     levothyroxine (SYNTHROID) 175 MCG tablet, Take 175 mcg by mouth Daily, Disp: , Rfl:     lithium 300 MG capsule, Take 300 mg by mouth in the morning., Disp: , Rfl:     simvastatin (ZOCOR) 40 MG tablet, Take 40 mg by mouth in the morning., Disp: , Rfl:     lisinopril (PRINIVIL;ZESTRIL) 2.5 MG tablet, Take 1 tablet by mouth in the morning. (Patient not taking: Reported on 9/8/2022), Disp: 30 tablet, Rfl: 1    Past Medical History  Em Hardy  has a past medical history of Hyperlipidemia and Thyroid disease. Social History  Em Antony  reports that he has been smoking cigars. He has never used smokeless tobacco. He reports current alcohol use. He reports that he does not use drugs. Family History  Emkylah Hardy family history is not on file. There is no family history of bicuspid aortic valve, aneurysms, heart transplant, pacemakers, defibrillators, or sudden cardiac death.       Past Surgical History   Past Surgical History:   Procedure Laterality Date    JOINT REPLACEMENT         Review of Systems   Constitutional: Negative for chills and fever  HENT: Negative for congestion, sinus pressure, sneezing and sore throat. Eyes: Negative for pain, discharge, redness and itching. Respiratory: Negative for apnea, cough  Gastrointestinal: Negative for blood in stool, constipation, diarrhea   Endocrine: Negative for cold intolerance, heat intolerance, polydipsia. Genitourinary: Negative for dysuria, enuresis, flank pain and hematuria. Musculoskeletal: Negative for arthralgias, joint swelling and neck pain. Neurological: Negative for numbness and headaches. Psychiatric/Behavioral: Negative for agitation, confusion, decreased concentration and dysphoric mood. Objective:     BP (!) 150/72   Pulse 78   Ht 6' 3\" (1.905 m)   Wt 227 lb 6.4 oz (103.1 kg)   BMI 28.42 kg/m²     Wt Readings from Last 3 Encounters:   09/08/22 227 lb 6.4 oz (103.1 kg)   08/05/22 219 lb 9.3 oz (99.6 kg)     BP Readings from Last 3 Encounters:   09/08/22 (!) 150/72   08/05/22 132/80       Nursing note and vitals reviewed. Physical Exam   Constitutional: Oriented to person, place, and time. Appears well-developed and well-nourished. HENT:   Head: Normocephalic and atraumatic. Eyes: EOM are normal. Pupils are equal, round, and reactive to light. Neck: Normal range of motion. Neck supple. No JVD present. Cardiovascular: Normal rate, regular rhythm, normal heart sounds and intact distal pulses. No murmur heard. Pulmonary/Chest: Effort normal and breath sounds normal. No respiratory distress. No wheezes. No rales. Abdominal: Soft. Bowel sounds are normal. No distension. There is no tenderness. Musculoskeletal: Normal range of motion. No edema. Neurological: Alert and oriented to person, place, and time. No cranial nerve deficit. Coordination normal.   Skin: Skin is warm and dry. Psychiatric: Normal mood and affect.        No results found for: CKTOTAL, CKMB, CKMBINDEX    Lab Results   Component Value Date/Time    WBC 8.0 08/05/2022 05:32 AM    RBC 4.87 08/05/2022 05:32 AM    HGB 15.3 08/05/2022 05:32 AM    HCT 47.4 08/05/2022 05:32 AM    MCV 97.3 08/05/2022 05:32 AM    MCH 31.4 08/05/2022 05:32 AM    MCHC 32.3 08/05/2022 05:32 AM     08/05/2022 05:32 AM    MPV 10.2 08/05/2022 05:32 AM       Lab Results   Component Value Date/Time     08/05/2022 05:32 AM    K 4.8 08/05/2022 05:32 AM    K 4.8 08/05/2022 05:32 AM     08/05/2022 05:32 AM    CO2 27 08/05/2022 05:32 AM    BUN 19 08/05/2022 05:32 AM    LABALBU 4.2 08/02/2022 03:25 AM    CREATININE 1.3 08/05/2022 05:32 AM    CALCIUM 10.0 08/05/2022 05:32 AM    LABGLOM 54 08/05/2022 05:32 AM    GLUCOSE 101 08/05/2022 05:32 AM       Lab Results   Component Value Date/Time    ALKPHOS 72 08/02/2022 03:25 AM    ALT 13 08/02/2022 03:25 AM    AST 19 08/02/2022 03:25 AM    PROT 6.4 08/02/2022 03:25 AM    BILITOT 0.4 08/02/2022 03:25 AM    BILIDIR <0.2 08/02/2022 03:25 AM    LABALBU 4.2 08/02/2022 03:25 AM       No results found for: MG    Lab Results   Component Value Date    INR 0.93 08/05/2022         No results found for: LABA1C    Lab Results   Component Value Date/Time    TRIG 67 08/02/2022 09:23 AM    HDL 58 08/02/2022 09:23 AM    LDLCALC 65 08/02/2022 09:23 AM       Lab Results   Component Value Date/Time    TSH 1.100 08/02/2022 09:23 AM         Testing Reviewed:      I have individually reviewed the cardiac test below:    ECHO: Results for orders placed during the hospital encounter of 08/02/22    ECHO Complete 2D W Doppler W Color    Narrative  Transthoracic Echocardiography Report (TTE)    Demographics    Patient Name    Edilson Buckley Gender                Male    MR #            [id number]     Race                      Ethnicity    Account #       [de-identified]     Room Number           0013    Accession       [id number]    Date of Study         08/02/2022  Number    Date of Birth   1945    Referring Physician   Rae Fairbanks DO Lonnie    Age             78 year(s)    Sonographer           Mark Rutherford [facility] [[email or web address]]    Interpreting          Krystyna Ann MD  Physician    Procedure    Type of Study    TTE procedure:ECHOCARDIOGRAM COMPLETE 2D W DOPPLER W COLOR. Procedure Date  Date: 08/02/2022 Start: 10:10 AM    Study Location: Bedside  Technical Quality: Limited visualization due to lung interference . Indications:Shortness of breath and Chest pain. Additional Medical History:Smoker    Patient Status: Routine    Height: 75 inches Weight: 224 pounds BSA: 2.3 m^2 BMI: 28 kg/m^2    BP: 176/74 mmHg    Conclusions    Summary  Normal left ventricular size and systolic function. There were no regional wall motion abnormalities. Wall thickness was within normal limits. Ejection fraction was estimated at 50-55%. Doppler parameters were consistent with abnormal left ventricular  relaxation (grade 1 diastolic dysfunction). There was trace aortic regurgitation. Ascending aorta = 4.3 cm. IVC size is within normal limits with normal respiratory phasic changes. Signature    ----------------------------------------------------------------  Electronically signed by Krystyna Ann MD (Interpreting  physician) on 08/02/2022 at 02:26 PM  ----------------------------------------------------------------    Findings    Mitral Valve  The mitral valve structure was normal with normal leaflet separation. DOPPLER: The transmitral velocity was within the normal range with no  evidence for mitral stenosis. There was no evidence of mitral  regurgitation. Aortic Valve  The aortic valve was trileaflet with normal thickness and cuspal  separation. DOPPLER: Transaortic velocity was within the normal range with  no evidence of aortic stenosis. There was trace aortic regurgitation. Tricuspid Valve  The tricuspid valve structure was normal with normal leaflet separation. DOPPLER: There was no evidence of tricuspid stenosis.  There was no  evidence of tricuspid regurgitation. Pulmonic Valve  The pulmonic valve leaflets were not well seen. DOPPLER: The transpulmonic  velocity was within the normal range with no evidence for regurgitation. Left Atrium  Left atrial size was normal.    Left Ventricle  Normal left ventricular size and systolic function. There were no regional wall motion abnormalities. Wall thickness was within normal limits. Ejection fraction was estimated at 50-55%. Doppler parameters were consistent with abnormal left ventricular  relaxation (grade 1 diastolic dysfunction). Right Atrium  Right atrial size was normal.    Right Ventricle  The right ventricular size was normal with normal systolic function and  wall thickness. Pericardial Effusion  The pericardium was normal in appearance with no evidence of a pericardial  effusion. Pleural Effusion  No evidence of pleural effusion. Aorta / Great Vessels  -Ascending aorta = 4.3 cm. -IVC size is within normal limits with normal respiratory phasic changes.     M-Mode/2D Measurements & Calculations    LV Diastolic   LV Systolic Dimension:    AV Cusp Separation: 1.7 cmLA  Dimension: 5.4 3.7 cm                    Dimension: 2.9 cmAO Root  cm             LV Volume Diastolic: 838  Dimension: 4.4 cmLA Area: 18.8  LV FS:31.5 %   ml                        cm^2  LV PW          LV Volume Systolic: 53.1  Diastolic: 1   ml  cm             LV EDV/LV EDV Index: 141  Septum         ml/61 m^2LV ESV/LV ESV    RV Diastolic Dimension: 3.2 cm  Diastolic: 1   Index: 72.2 ml/25 m^2  cm             EF Calculated: 58.8 %     LA/Aorta: 0.66  Ascending Aorta: 4.3 cm  LA volume/Index: 50.3 ml /22m^2    Doppler Measurements & Calculations    MV Peak E-Wave: 64.3 cm/s  AV Peak Velocity: 173  LVOT Peak Velocity: 168  MV Peak A-Wave: 63 cm/s    cm/s                   cm/s  MV E/A Ratio: 1.02         AV Peak Gradient:      LVOT Peak Gradient: 11  MV Peak Gradient: 1.65     11.97 mmHg             mmHg  mmHg  TV Peak E-Wave: 30 cm/s  MV Deceleration Time: 273                         TV Peak A-Wave: 37.3  msec                                              cm/s  MV P1/2t: 80 msec  MVA by PHT:2.75 cm^2                              TV Peak Gradient: 0.36  mmHg  MV E' Septal Velocity: 6.6 AV DVI (Vmax):0.97     TR Velocity:223 cm/s  cm/s                                              TR Gradient:19.89 mmHg  MV A' Septal Velocity:                            PV Peak Velocity: 69.8  12.5 cm/s                                         cm/s  MV E' Lateral Velocity:                           PV Peak Gradient: 1.95  8.3 cm/s                                          mmHg  MV A' Lateral Velocity:  15.2 cm/s  E/E' septal: 9.74  E/E' lateral: 7.75  MR Velocity: 595 cm/s    [email or web address] [id number]       Assessment/Plan   Atypical chest pain - resolved  HTN  HLD  Hyperthyroidism s/p radioactive iodine; on levothyroxine  Bipolar disorder on lamotrigine and lithium  Preserved EF  Patent coronaries, 8/2/22  Doing well, no recurrence, HTN uncontrolled, ran out of ACEi, will refill. F/u Dr. Luis Varghese. He has no CAD. He wants to stop ASA. This is reasonable given his age and risk of bleeding. RF management. Discussed diet/exercise/BP/weight loss/health lifestyle choices/lipids; the patient understands the goals and will try to comply.         Disposition:  prn         Electronically signed by Yana Interiano MD   9/8/2022 at 8:50 AM EDT

## 2022-09-08 NOTE — PROGRESS NOTES
Hospital follow-up. He hasn't had any chest pain since he was discharged. He has been out of his lisinopril x 2 days.

## 2023-10-10 RX ORDER — LISINOPRIL 2.5 MG/1
2.5 TABLET ORAL DAILY
Qty: 30 TABLET | Refills: 11 | OUTPATIENT
Start: 2023-10-10

## 2023-10-10 NOTE — TELEPHONE ENCOUNTER
Lydia Cummings called requesting a refill on the following medications:  Requested Prescriptions     Pending Prescriptions Disp Refills    lisinopril (PRINIVIL;ZESTRIL) 2.5 MG tablet 30 tablet 11     Sig: Take 1 tablet by mouth daily     Pharmacy verified: [facility] on [address]    PT IS OUT OF MEDICATION    Date of last visit: 9/8/2022  Date of next visit (if applicable): Visit date not found - please call patient to schedule with Dr Jatinder Blue

## 2023-10-16 RX ORDER — LISINOPRIL 2.5 MG/1
2.5 TABLET ORAL DAILY
Qty: 30 TABLET | Refills: 0 | Status: SHIPPED | OUTPATIENT
Start: 2023-10-16

## 2023-10-16 NOTE — TELEPHONE ENCOUNTER
Health Maintenance Due   Topic Date Due   • Shingles Vaccine (1 of 2) Never done   • DTaP/Tdap/Td Vaccine (2 - Td or Tdap) 02/03/2020   • Abdominal Aortic Aneurysm (AAA) Screening  Never done   • Pneumococcal Vaccine 65+ (1 - PCV) Never done   • Medicare Advantage- Medicare Wellness Visit  01/01/2023          Pt wife needs a 30 days supply of lisinopril until pt can get seen by PCP   Rx pended